# Patient Record
Sex: FEMALE | Race: BLACK OR AFRICAN AMERICAN | NOT HISPANIC OR LATINO
[De-identification: names, ages, dates, MRNs, and addresses within clinical notes are randomized per-mention and may not be internally consistent; named-entity substitution may affect disease eponyms.]

---

## 2015-09-24 RX ORDER — HYDRALAZINE HCL 50 MG
1 TABLET ORAL
Qty: 0 | Refills: 0 | COMMUNITY
Start: 2015-09-24

## 2015-09-24 RX ORDER — ATENOLOL 25 MG/1
1 TABLET ORAL
Qty: 0 | Refills: 0 | COMMUNITY
Start: 2015-09-24

## 2017-06-04 PROBLEM — M25.561 PAIN IN BOTH KNEES, UNSPECIFIED CHRONICITY: Status: ACTIVE | Noted: 2017-06-04

## 2017-06-09 ENCOUNTER — APPOINTMENT (OUTPATIENT)
Dept: ORTHOPEDIC SURGERY | Facility: CLINIC | Age: 82
End: 2017-06-09

## 2017-06-09 VITALS
WEIGHT: 212 LBS | HEART RATE: 41 BPM | HEIGHT: 64 IN | SYSTOLIC BLOOD PRESSURE: 162 MMHG | DIASTOLIC BLOOD PRESSURE: 64 MMHG | BODY MASS INDEX: 36.19 KG/M2

## 2017-06-09 DIAGNOSIS — M25.561 PAIN IN RIGHT KNEE: ICD-10-CM

## 2017-06-09 DIAGNOSIS — M17.12 UNILATERAL PRIMARY OSTEOARTHRITIS, LEFT KNEE: ICD-10-CM

## 2017-06-09 DIAGNOSIS — M25.562 PAIN IN RIGHT KNEE: ICD-10-CM

## 2017-07-19 ENCOUNTER — OUTPATIENT (OUTPATIENT)
Dept: OUTPATIENT SERVICES | Facility: HOSPITAL | Age: 82
LOS: 1 days | End: 2017-07-19
Payer: MEDICARE

## 2017-07-19 VITALS
HEIGHT: 61 IN | WEIGHT: 207.23 LBS | TEMPERATURE: 98 F | DIASTOLIC BLOOD PRESSURE: 59 MMHG | RESPIRATION RATE: 20 BRPM | SYSTOLIC BLOOD PRESSURE: 183 MMHG | HEART RATE: 42 BPM

## 2017-07-19 DIAGNOSIS — M17.12 UNILATERAL PRIMARY OSTEOARTHRITIS, LEFT KNEE: ICD-10-CM

## 2017-07-19 DIAGNOSIS — Z01.818 ENCOUNTER FOR OTHER PREPROCEDURAL EXAMINATION: ICD-10-CM

## 2017-07-19 DIAGNOSIS — I10 ESSENTIAL (PRIMARY) HYPERTENSION: ICD-10-CM

## 2017-07-19 LAB
ANION GAP SERPL CALC-SCNC: 14 MMOL/L — SIGNIFICANT CHANGE UP (ref 5–17)
APTT BLD: 30.2 SEC — SIGNIFICANT CHANGE UP (ref 27.5–37.4)
BASOPHILS # BLD AUTO: 0 K/UL — SIGNIFICANT CHANGE UP (ref 0–0.2)
BASOPHILS NFR BLD AUTO: 0.3 % — SIGNIFICANT CHANGE UP (ref 0–2)
BLD GP AB SCN SERPL QL: SIGNIFICANT CHANGE UP
BUN SERPL-MCNC: 23 MG/DL — HIGH (ref 8–20)
CALCIUM SERPL-MCNC: 9.4 MG/DL — SIGNIFICANT CHANGE UP (ref 8.6–10.2)
CHLORIDE SERPL-SCNC: 104 MMOL/L — SIGNIFICANT CHANGE UP (ref 98–107)
CO2 SERPL-SCNC: 26 MMOL/L — SIGNIFICANT CHANGE UP (ref 22–29)
CREAT SERPL-MCNC: 0.89 MG/DL — SIGNIFICANT CHANGE UP (ref 0.5–1.3)
EOSINOPHIL # BLD AUTO: 0.2 K/UL — SIGNIFICANT CHANGE UP (ref 0–0.5)
EOSINOPHIL NFR BLD AUTO: 2.4 % — SIGNIFICANT CHANGE UP (ref 0–6)
GLUCOSE SERPL-MCNC: 115 MG/DL — SIGNIFICANT CHANGE UP (ref 70–115)
HCT VFR BLD CALC: 38.6 % — SIGNIFICANT CHANGE UP (ref 37–47)
HGB BLD-MCNC: 12.9 G/DL — SIGNIFICANT CHANGE UP (ref 12–16)
INR BLD: 0.99 RATIO — SIGNIFICANT CHANGE UP (ref 0.88–1.16)
LYMPHOCYTES # BLD AUTO: 2 K/UL — SIGNIFICANT CHANGE UP (ref 1–4.8)
LYMPHOCYTES # BLD AUTO: 25.8 % — SIGNIFICANT CHANGE UP (ref 20–55)
MAGNESIUM SERPL-MCNC: 2.1 MG/DL — SIGNIFICANT CHANGE UP (ref 1.6–2.6)
MCHC RBC-ENTMCNC: 30.6 PG — SIGNIFICANT CHANGE UP (ref 27–31)
MCHC RBC-ENTMCNC: 33.4 G/DL — SIGNIFICANT CHANGE UP (ref 32–36)
MCV RBC AUTO: 91.5 FL — SIGNIFICANT CHANGE UP (ref 81–99)
MONOCYTES # BLD AUTO: 0.7 K/UL — SIGNIFICANT CHANGE UP (ref 0–0.8)
MONOCYTES NFR BLD AUTO: 9.4 % — SIGNIFICANT CHANGE UP (ref 3–10)
MRSA PCR RESULT.: SIGNIFICANT CHANGE UP
NEUTROPHILS # BLD AUTO: 4.9 K/UL — SIGNIFICANT CHANGE UP (ref 1.8–8)
NEUTROPHILS NFR BLD AUTO: 62 % — SIGNIFICANT CHANGE UP (ref 37–73)
PLATELET # BLD AUTO: 225 K/UL — SIGNIFICANT CHANGE UP (ref 150–400)
POTASSIUM SERPL-MCNC: 3.4 MMOL/L — LOW (ref 3.5–5.3)
POTASSIUM SERPL-SCNC: 3.4 MMOL/L — LOW (ref 3.5–5.3)
PROTHROM AB SERPL-ACNC: 10.9 SEC — SIGNIFICANT CHANGE UP (ref 9.8–12.7)
RBC # BLD: 4.22 M/UL — LOW (ref 4.4–5.2)
RBC # FLD: 13.9 % — SIGNIFICANT CHANGE UP (ref 11–15.6)
S AUREUS DNA NOSE QL NAA+PROBE: SIGNIFICANT CHANGE UP
SODIUM SERPL-SCNC: 144 MMOL/L — SIGNIFICANT CHANGE UP (ref 135–145)
TYPE + AB SCN PNL BLD: SIGNIFICANT CHANGE UP
WBC # BLD: 7.9 K/UL — SIGNIFICANT CHANGE UP (ref 4.8–10.8)
WBC # FLD AUTO: 7.9 K/UL — SIGNIFICANT CHANGE UP (ref 4.8–10.8)

## 2017-07-19 PROCEDURE — 93005 ELECTROCARDIOGRAM TRACING: CPT

## 2017-07-19 PROCEDURE — 87641 MR-STAPH DNA AMP PROBE: CPT

## 2017-07-19 PROCEDURE — 86901 BLOOD TYPING SEROLOGIC RH(D): CPT

## 2017-07-19 PROCEDURE — 93010 ELECTROCARDIOGRAM REPORT: CPT

## 2017-07-19 PROCEDURE — 36415 COLL VENOUS BLD VENIPUNCTURE: CPT

## 2017-07-19 PROCEDURE — 80048 BASIC METABOLIC PNL TOTAL CA: CPT

## 2017-07-19 PROCEDURE — G0463: CPT

## 2017-07-19 PROCEDURE — 87640 STAPH A DNA AMP PROBE: CPT

## 2017-07-19 PROCEDURE — 83735 ASSAY OF MAGNESIUM: CPT

## 2017-07-19 PROCEDURE — 85027 COMPLETE CBC AUTOMATED: CPT

## 2017-07-19 PROCEDURE — 85730 THROMBOPLASTIN TIME PARTIAL: CPT

## 2017-07-19 PROCEDURE — 86900 BLOOD TYPING SEROLOGIC ABO: CPT

## 2017-07-19 PROCEDURE — 85610 PROTHROMBIN TIME: CPT

## 2017-07-19 PROCEDURE — 86850 RBC ANTIBODY SCREEN: CPT

## 2017-07-19 NOTE — H&P PST ADULT - NSANTHOSAYNRD_GEN_A_CORE
No. RACHELL screening performed.  STOP BANG Legend: 0-2 = LOW Risk; 3-4 = INTERMEDIATE Risk; 5-8 = HIGH Risk

## 2017-07-19 NOTE — H&P PST ADULT - PMH
HTN (hypertension)    Hyperlipidemia    Osteoarthritis Gout    HTN (hypertension)    Hyperlipidemia    Osteoarthritis    Osteoporosis

## 2017-07-19 NOTE — H&P PST ADULT - HISTORY OF PRESENT ILLNESS
82F with osteoarthritis for left knee replacement. 82F with osteoarthritis for left knee replacement. Has had pain "for some years now" and now has gotten so severe, for surgical repair.

## 2017-08-10 ENCOUNTER — TRANSCRIPTION ENCOUNTER (OUTPATIENT)
Age: 82
End: 2017-08-10

## 2017-08-10 ENCOUNTER — APPOINTMENT (OUTPATIENT)
Dept: ORTHOPEDIC SURGERY | Facility: HOSPITAL | Age: 82
End: 2017-08-10

## 2017-08-10 ENCOUNTER — RESULT REVIEW (OUTPATIENT)
Age: 82
End: 2017-08-10

## 2017-08-10 ENCOUNTER — INPATIENT (INPATIENT)
Facility: HOSPITAL | Age: 82
LOS: 1 days | Discharge: NURSING, REHAB OR LONG-TERM CARE | DRG: 470 | End: 2017-08-12
Attending: ORTHOPAEDIC SURGERY | Admitting: ORTHOPAEDIC SURGERY
Payer: MEDICARE

## 2017-08-10 VITALS
HEIGHT: 64 IN | TEMPERATURE: 99 F | SYSTOLIC BLOOD PRESSURE: 170 MMHG | RESPIRATION RATE: 16 BRPM | DIASTOLIC BLOOD PRESSURE: 548 MMHG | WEIGHT: 207.01 LBS | HEART RATE: 72 BPM | OXYGEN SATURATION: 98 %

## 2017-08-10 DIAGNOSIS — Z87.39 PERSONAL HISTORY OF OTHER DISEASES OF THE MUSCULOSKELETAL SYSTEM AND CONNECTIVE TISSUE: ICD-10-CM

## 2017-08-10 DIAGNOSIS — Z78.9 OTHER SPECIFIED HEALTH STATUS: ICD-10-CM

## 2017-08-10 DIAGNOSIS — R32 UNSPECIFIED URINARY INCONTINENCE: ICD-10-CM

## 2017-08-10 DIAGNOSIS — M19.90 UNSPECIFIED OSTEOARTHRITIS, UNSPECIFIED SITE: ICD-10-CM

## 2017-08-10 DIAGNOSIS — I10 ESSENTIAL (PRIMARY) HYPERTENSION: ICD-10-CM

## 2017-08-10 DIAGNOSIS — M17.12 UNILATERAL PRIMARY OSTEOARTHRITIS, LEFT KNEE: ICD-10-CM

## 2017-08-10 DIAGNOSIS — K59.00 CONSTIPATION, UNSPECIFIED: ICD-10-CM

## 2017-08-10 DIAGNOSIS — E78.5 HYPERLIPIDEMIA, UNSPECIFIED: ICD-10-CM

## 2017-08-10 DIAGNOSIS — I49.3 VENTRICULAR PREMATURE DEPOLARIZATION: ICD-10-CM

## 2017-08-10 DIAGNOSIS — Z86.79 PERSONAL HISTORY OF OTHER DISEASES OF THE CIRCULATORY SYSTEM: ICD-10-CM

## 2017-08-10 LAB
BLD GP AB SCN SERPL QL: SIGNIFICANT CHANGE UP
TYPE + AB SCN PNL BLD: SIGNIFICANT CHANGE UP

## 2017-08-10 PROCEDURE — 88311 DECALCIFY TISSUE: CPT | Mod: 26

## 2017-08-10 PROCEDURE — 73560 X-RAY EXAM OF KNEE 1 OR 2: CPT | Mod: 26,LT

## 2017-08-10 PROCEDURE — 27447 TOTAL KNEE ARTHROPLASTY: CPT | Mod: LT

## 2017-08-10 PROCEDURE — 99223 1ST HOSP IP/OBS HIGH 75: CPT

## 2017-08-10 PROCEDURE — 27447 TOTAL KNEE ARTHROPLASTY: CPT | Mod: AS,LT

## 2017-08-10 PROCEDURE — 88305 TISSUE EXAM BY PATHOLOGIST: CPT | Mod: 26

## 2017-08-10 RX ORDER — CEFAZOLIN SODIUM 1 G
2000 VIAL (EA) INJECTION ONCE
Qty: 0 | Refills: 0 | Status: DISCONTINUED | OUTPATIENT
Start: 2017-08-10 | End: 2017-08-10

## 2017-08-10 RX ORDER — ONDANSETRON 8 MG/1
4 TABLET, FILM COATED ORAL ONCE
Qty: 0 | Refills: 0 | Status: DISCONTINUED | OUTPATIENT
Start: 2017-08-10 | End: 2017-08-10

## 2017-08-10 RX ORDER — ALLOPURINOL 300 MG
1 TABLET ORAL
Qty: 0 | Refills: 0 | COMMUNITY

## 2017-08-10 RX ORDER — HYDRALAZINE HCL 50 MG
50 TABLET ORAL
Qty: 0 | Refills: 0 | Status: DISCONTINUED | OUTPATIENT
Start: 2017-08-10 | End: 2017-08-12

## 2017-08-10 RX ORDER — ACETAMINOPHEN 500 MG
650 TABLET ORAL EVERY 6 HOURS
Qty: 0 | Refills: 0 | Status: DISCONTINUED | OUTPATIENT
Start: 2017-08-10 | End: 2017-08-12

## 2017-08-10 RX ORDER — FENTANYL CITRATE 50 UG/ML
50 INJECTION INTRAVENOUS
Qty: 0 | Refills: 0 | Status: DISCONTINUED | OUTPATIENT
Start: 2017-08-10 | End: 2017-08-10

## 2017-08-10 RX ORDER — TRANEXAMIC ACID 100 MG/ML
950 INJECTION, SOLUTION INTRAVENOUS ONCE
Qty: 0 | Refills: 0 | Status: DISCONTINUED | OUTPATIENT
Start: 2017-08-10 | End: 2017-08-10

## 2017-08-10 RX ORDER — CELECOXIB 200 MG/1
200 CAPSULE ORAL ONCE
Qty: 0 | Refills: 0 | Status: COMPLETED | OUTPATIENT
Start: 2017-08-10 | End: 2017-08-10

## 2017-08-10 RX ORDER — HYDROMORPHONE HYDROCHLORIDE 2 MG/ML
0.5 INJECTION INTRAMUSCULAR; INTRAVENOUS; SUBCUTANEOUS
Qty: 0 | Refills: 0 | Status: DISCONTINUED | OUTPATIENT
Start: 2017-08-10 | End: 2017-08-12

## 2017-08-10 RX ORDER — ONDANSETRON 8 MG/1
4 TABLET, FILM COATED ORAL EVERY 6 HOURS
Qty: 0 | Refills: 0 | Status: DISCONTINUED | OUTPATIENT
Start: 2017-08-10 | End: 2017-08-12

## 2017-08-10 RX ORDER — ASPIRIN/CALCIUM CARB/MAGNESIUM 324 MG
162 TABLET ORAL
Qty: 0 | Refills: 0 | Status: DISCONTINUED | OUTPATIENT
Start: 2017-08-11 | End: 2017-08-12

## 2017-08-10 RX ORDER — RALOXIFENE HYDROCHLORIDE 60 MG/1
60 TABLET, COATED ORAL DAILY
Qty: 0 | Refills: 0 | Status: DISCONTINUED | OUTPATIENT
Start: 2017-08-10 | End: 2017-08-12

## 2017-08-10 RX ORDER — VANCOMYCIN HCL 1 G
1500 VIAL (EA) INTRAVENOUS ONCE
Qty: 0 | Refills: 0 | Status: COMPLETED | OUTPATIENT
Start: 2017-08-10 | End: 2017-08-10

## 2017-08-10 RX ORDER — ATENOLOL 25 MG/1
50 TABLET ORAL
Qty: 0 | Refills: 0 | Status: DISCONTINUED | OUTPATIENT
Start: 2017-08-10 | End: 2017-08-12

## 2017-08-10 RX ORDER — SODIUM CHLORIDE 9 MG/ML
3 INJECTION INTRAMUSCULAR; INTRAVENOUS; SUBCUTANEOUS EVERY 8 HOURS
Qty: 0 | Refills: 0 | Status: DISCONTINUED | OUTPATIENT
Start: 2017-08-10 | End: 2017-08-10

## 2017-08-10 RX ORDER — POLYETHYLENE GLYCOL 3350 17 G/17G
17 POWDER, FOR SOLUTION ORAL DAILY
Qty: 0 | Refills: 0 | Status: DISCONTINUED | OUTPATIENT
Start: 2017-08-10 | End: 2017-08-12

## 2017-08-10 RX ORDER — SODIUM CHLORIDE 9 MG/ML
1000 INJECTION, SOLUTION INTRAVENOUS
Qty: 0 | Refills: 0 | Status: DISCONTINUED | OUTPATIENT
Start: 2017-08-10 | End: 2017-08-12

## 2017-08-10 RX ORDER — VANCOMYCIN HCL 1 G
1000 VIAL (EA) INTRAVENOUS
Qty: 0 | Refills: 0 | Status: COMPLETED | OUTPATIENT
Start: 2017-08-10 | End: 2017-08-10

## 2017-08-10 RX ORDER — ALLOPURINOL 300 MG
300 TABLET ORAL DAILY
Qty: 0 | Refills: 0 | Status: DISCONTINUED | OUTPATIENT
Start: 2017-08-10 | End: 2017-08-12

## 2017-08-10 RX ORDER — SODIUM CHLORIDE 9 MG/ML
1000 INJECTION, SOLUTION INTRAVENOUS
Qty: 0 | Refills: 0 | Status: DISCONTINUED | OUTPATIENT
Start: 2017-08-10 | End: 2017-08-10

## 2017-08-10 RX ORDER — HYDROMORPHONE HYDROCHLORIDE 2 MG/ML
2 INJECTION INTRAMUSCULAR; INTRAVENOUS; SUBCUTANEOUS
Qty: 0 | Refills: 0 | Status: DISCONTINUED | OUTPATIENT
Start: 2017-08-10 | End: 2017-08-12

## 2017-08-10 RX ORDER — MAGNESIUM HYDROXIDE 400 MG/1
30 TABLET, CHEWABLE ORAL DAILY
Qty: 0 | Refills: 0 | Status: DISCONTINUED | OUTPATIENT
Start: 2017-08-10 | End: 2017-08-12

## 2017-08-10 RX ORDER — OXYCODONE HYDROCHLORIDE 5 MG/1
5 TABLET ORAL
Qty: 0 | Refills: 0 | Status: DISCONTINUED | OUTPATIENT
Start: 2017-08-10 | End: 2017-08-12

## 2017-08-10 RX ORDER — CEFAZOLIN SODIUM 1 G
2000 VIAL (EA) INJECTION
Qty: 0 | Refills: 0 | Status: COMPLETED | OUTPATIENT
Start: 2017-08-10 | End: 2017-08-10

## 2017-08-10 RX ORDER — DOCUSATE SODIUM 100 MG
100 CAPSULE ORAL THREE TIMES A DAY
Qty: 0 | Refills: 0 | Status: DISCONTINUED | OUTPATIENT
Start: 2017-08-10 | End: 2017-08-12

## 2017-08-10 RX ORDER — SENNA PLUS 8.6 MG/1
2 TABLET ORAL AT BEDTIME
Qty: 0 | Refills: 0 | Status: DISCONTINUED | OUTPATIENT
Start: 2017-08-10 | End: 2017-08-12

## 2017-08-10 RX ORDER — OXYCODONE HYDROCHLORIDE 5 MG/1
10 TABLET ORAL
Qty: 0 | Refills: 0 | Status: DISCONTINUED | OUTPATIENT
Start: 2017-08-10 | End: 2017-08-12

## 2017-08-10 RX ORDER — AMLODIPINE BESYLATE 2.5 MG/1
10 TABLET ORAL DAILY
Qty: 0 | Refills: 0 | Status: DISCONTINUED | OUTPATIENT
Start: 2017-08-11 | End: 2017-08-12

## 2017-08-10 RX ORDER — OXYCODONE HYDROCHLORIDE 5 MG/1
10 TABLET ORAL EVERY 12 HOURS
Qty: 0 | Refills: 0 | Status: DISCONTINUED | OUTPATIENT
Start: 2017-08-10 | End: 2017-08-12

## 2017-08-10 RX ORDER — HYDROCHLOROTHIAZIDE 25 MG
25 TABLET ORAL DAILY
Qty: 0 | Refills: 0 | Status: DISCONTINUED | OUTPATIENT
Start: 2017-08-12 | End: 2017-08-12

## 2017-08-10 RX ORDER — VALSARTAN 80 MG/1
320 TABLET ORAL DAILY
Qty: 0 | Refills: 0 | Status: DISCONTINUED | OUTPATIENT
Start: 2017-08-11 | End: 2017-08-12

## 2017-08-10 RX ADMIN — Medication 250 MILLIGRAM(S): at 18:16

## 2017-08-10 RX ADMIN — Medication 650 MILLIGRAM(S): at 21:48

## 2017-08-10 RX ADMIN — OXYCODONE HYDROCHLORIDE 10 MILLIGRAM(S): 5 TABLET ORAL at 15:36

## 2017-08-10 RX ADMIN — Medication 50 MILLIGRAM(S): at 17:49

## 2017-08-10 RX ADMIN — Medication 100 MILLIGRAM(S): at 21:20

## 2017-08-10 RX ADMIN — Medication 300 MILLIGRAM(S): at 07:12

## 2017-08-10 RX ADMIN — SODIUM CHLORIDE 85 MILLILITER(S): 9 INJECTION, SOLUTION INTRAVENOUS at 17:49

## 2017-08-10 RX ADMIN — Medication 650 MILLIGRAM(S): at 21:21

## 2017-08-10 RX ADMIN — SODIUM CHLORIDE 85 MILLILITER(S): 9 INJECTION, SOLUTION INTRAVENOUS at 18:16

## 2017-08-10 RX ADMIN — CELECOXIB 200 MILLIGRAM(S): 200 CAPSULE ORAL at 07:28

## 2017-08-10 RX ADMIN — FENTANYL CITRATE 50 MICROGRAM(S): 50 INJECTION INTRAVENOUS at 13:10

## 2017-08-10 RX ADMIN — ATENOLOL 50 MILLIGRAM(S): 25 TABLET ORAL at 17:49

## 2017-08-10 RX ADMIN — FENTANYL CITRATE 50 MICROGRAM(S): 50 INJECTION INTRAVENOUS at 13:02

## 2017-08-10 RX ADMIN — OXYCODONE HYDROCHLORIDE 10 MILLIGRAM(S): 5 TABLET ORAL at 16:00

## 2017-08-10 NOTE — CONSULT NOTE ADULT - ATTENDING COMMENTS
pt seen and examined at bedside.  urinary incontinence - most likely 2/2 anesthetics - if persists would check UA. knee pain is tolerable.   she is hypertensive, took her am BP meds.   Ct home meds.  takes albuterol inhalers prn, Singulair for allergic rhinitis/sinusitis  Ct telemetry postop - 2/2 frequent PVCs - currently asymptomatic  Monitor Lytes, CBC

## 2017-08-10 NOTE — PHYSICAL THERAPY INITIAL EVALUATION ADULT - CRITERIA FOR SKILLED THERAPEUTIC INTERVENTIONS
anticipated discharge recommendation/anticipated equipment needs at discharge/Home with home PT, RW vs GABINO/rehab potential/impairments found

## 2017-08-10 NOTE — PHYSICAL THERAPY INITIAL EVALUATION ADULT - RANGE OF MOTION EXAMINATION, REHAB EVAL
Right LE ROM was WFL (within functional limits)/left knee lacking approx 15-20 degrees extension (in sitting) to approx 30-35 degrees flexion (in supine)/bilateral upper extremity ROM was WFL (within functional limits)

## 2017-08-10 NOTE — CONSULT NOTE ADULT - PROBLEM SELECTOR RECOMMENDATION 9
S/P Left total knee replacement  08/10/2017   Post op abx as per SCIP  DVT ppx as per Ortho  pain control  PT  IS

## 2017-08-10 NOTE — PHYSICAL THERAPY INITIAL EVALUATION ADULT - GAIT DEVIATIONS NOTED, PT EVAL
decreased weight-shifting ability/decreased stance time 2*2 pain with weightbearing on LLE/decreased tristan

## 2017-08-10 NOTE — PHYSICAL THERAPY INITIAL EVALUATION ADULT - ADDITIONAL COMMENTS
Pt lives in a private home with her daughter. 4 steps to enter with handrails, 12 steps inside with 1 handrail. Pt was independent PTA without assist device. Pt does not owns DME.

## 2017-08-10 NOTE — PROGRESS NOTE ADULT - SUBJECTIVE AND OBJECTIVE BOX
SARAH WOLFE  [id number]    History: 82y Female is status post left total knee arthroplasty on 8/10/17, POD # 0. Patient is doing well and is comfortable. The patient's pain is controlled using the prescribed pain medications. The patient is participating in physical therapy. Denies nausea, vomiting, chest pain, shortness of breath, abdominal pain or fever. No new complaints.    MEDICATIONS  (STANDING):  allopurinol 300 milliGRAM(s) Oral daily  raloxifene 60 milliGRAM(s) Oral daily  hydrALAZINE 50 milliGRAM(s) Oral two times a day  lactated ringers. 1000 milliLiter(s) (85 mL/Hr) IV Continuous <Continuous>  acetaminophen   Tablet. 650 milliGRAM(s) Oral every 6 hours  ceFAZolin   IVPB 2000 milliGRAM(s) IV Intermittent <User Schedule>  polyethylene glycol 3350 17 Gram(s) Oral daily  docusate sodium 100 milliGRAM(s) Oral three times a day  oxyCODONE  ER Tablet 10 milliGRAM(s) Oral every 12 hours  ATENolol  Tablet 50 milliGRAM(s) Oral two times a day    MEDICATIONS  (PRN):  acetaminophen   Tablet 650 milliGRAM(s) Oral every 6 hours PRN For Temp over 38.3 C (100.94 F)  oxyCODONE    IR 5 milliGRAM(s) Oral every 3 hours PRN Mild Pain  oxyCODONE    IR 10 milliGRAM(s) Oral every 3 hours PRN Moderate Pain  HYDROmorphone  Injectable 0.5 milliGRAM(s) IV Push every 3 hours PRN breakthrough pain  aluminum hydroxide/magnesium hydroxide/simethicone Suspension 30 milliLiter(s) Oral four times a day PRN Indigestion  ondansetron Injectable 4 milliGRAM(s) IV Push every 6 hours PRN Nausea and/or Vomiting  magnesium hydroxide Suspension 30 milliLiter(s) Oral daily PRN Constipation  senna 2 Tablet(s) Oral at bedtime PRN Constipation  HYDROmorphone   Tablet 2 milliGRAM(s) Oral every 3 hours PRN Severe Pain (7 - 10)    Physical exam: The left knee dressing is clean, dry and intact. Hemovac intact and functioning. The calf is supple nontender. Passive range of motion is acceptable to due postoperative pain. Sensation to light touch is grossly intact distally. Motor function distally is 5/5. Extensor hallucis longus and flexor hallucis longus are intact. No foot drop. 2+ dorsalis pedis pulse. Capillary refill is less than 2 seconds. No cyanosis.    Primary Orthopedic Assessment:  •	s/p LEFT total knee replacement	    Plan:   •	DVT prophylaxis as prescribed, including use of compression devices and ankle pumps  •	Continue physical therapy  •	Weightbearing as tolerated of the left lower extremity with assistance of a walker  •	Incentive spirometry encouraged  •	Pain control as clinically indicated  •	Discharge planning – anticipated discharge is Home

## 2017-08-10 NOTE — PHYSICAL THERAPY INITIAL EVALUATION ADULT - GENERAL OBSERVATIONS, REHAB EVAL
Pt received supine on stretcher in PACU, + telemetry, + Venous Compression Boots + IV, no c/o pain at rest, agreeable to PT

## 2017-08-10 NOTE — CONSULT NOTE ADULT - SUBJECTIVE AND OBJECTIVE BOX
CC: Left Knee Pain    History of Present Illness	  82F with H/O HTN, HLD,  osteoarthritis of the left knee .  Has had pain "for some years now" and now has gotten so severe, for surgical repair. Now S/P left knee replacement, POD#0, patient seen and examined in PACU.         PAST MEDICAL & SURGICAL HISTORY:  Osteoporosis  Gout  Osteoarthritis  Hyperlipidemia  HTN (hypertension)  No significant past surgical history    Home Medications:     · 	hydrALAZINE 50 mg oral tablet , 1 tab(s) orally 2 times a day  · 	atenolol 50 mg oral tablet 1 tab(s) orally 2 times a day  · 	Exforge HCT 10 mg-320 mg-25 mg oral tablet 1 tab(s) orally once a day  · 	raloxifene 60 mg oral tablet 1 tab(s) orally once a day  · 	allopurinol 300 mg oral tablet 1 tab(s) orally once a day  · 	aspirin 81 mg oral tablet  1 tab(s) orally once a day  · 	Citracal: , 2 tab(s) orally once a day  · 	naproxen:     MEDICATIONS  (STANDING):  lactated ringers. 1000 milliLiter(s) (100 mL/Hr) IV Continuous <Continuous>  oxyCODONE  ER Tablet 10 milliGRAM(s) Oral every 12 hours  vancomycin  IVPB 1000 milliGRAM(s) IV Intermittent <User Schedule>    MEDICATIONS  (PRN):  fentaNYL    Injectable 50 MICROGram(s) IV Push every 5 minutes PRN Severe Pain  ondansetron Injectable 4 milliGRAM(s) IV Push once PRN Nausea and/or Vomiting  HYDROmorphone   Tablet 2 milliGRAM(s) Oral every 3 hours PRN Severe Pain (7 - 10)      Allergies    lisinopril (Angioedema)  streptomycin (Angioedema)    Intolerances        SOCIAL HISTORY:  Never a smoker  Denies ETOH/IVDA    FAMILY HISTORY:  Family history of breast cancer (Mother)      Vital Signs Last 24 Hrs  T(C): 37 (10 Aug 2017 12:00), Max: 37.2 (10 Aug 2017 06:55)  T(F): 98.6 (10 Aug 2017 12:00), Max: 99 (10 Aug 2017 06:55)  HR: 79 (10 Aug 2017 12:00) (71 - 79)  BP: 160/66 (10 Aug 2017 12:00) (106/94 - 170/548)  BP(mean): 86 (10 Aug 2017 12:00) (54 - 107)  RR: 22 (10 Aug 2017 12:00) (16 - 24)  SpO2: 96% (10 Aug 2017 12:00) (96% - 100%)    LABS:                  RADIOLOGY & ADDITIONAL STUDIES: CC: Left Knee Pain    History of Present Illness	  82F with H/O HTN, HLD,  osteoarthritis of the left knee .  Has had pain "for some years now" and now has gotten so severe, for surgical repair. Now S/P left knee replacement, POD#0, patient seen and examined in PACU.         PAST MEDICAL & SURGICAL HISTORY:  Osteoporosis  Gout  Osteoarthritis  Hyperlipidemia  HTN (hypertension)  No significant past surgical history    Home Medications:     · 	hydrALAZINE 50 mg oral tablet , 1 tab(s) orally 2 times a day  · 	atenolol 50 mg oral tablet 1 tab(s) orally 2 times a day  · 	Exforge HCT 10 mg-320 mg-25 mg oral tablet 1 tab(s) orally once a day  · 	raloxifene 60 mg oral tablet 1 tab(s) orally once a day  · 	allopurinol 300 mg oral tablet 1 tab(s) orally once a day  · 	aspirin 81 mg oral tablet  1 tab(s) orally once a day  · 	Citracal: , 2 tab(s) orally once a day  · 	naproxen:     MEDICATIONS  (STANDING):  lactated ringers. 1000 milliLiter(s) (100 mL/Hr) IV Continuous <Continuous>  oxyCODONE  ER Tablet 10 milliGRAM(s) Oral every 12 hours  vancomycin  IVPB 1000 milliGRAM(s) IV Intermittent <User Schedule>    MEDICATIONS  (PRN):  fentaNYL    Injectable 50 MICROGram(s) IV Push every 5 minutes PRN Severe Pain  ondansetron Injectable 4 milliGRAM(s) IV Push once PRN Nausea and/or Vomiting  HYDROmorphone   Tablet 2 milliGRAM(s) Oral every 3 hours PRN Severe Pain (7 - 10)      Allergies    lisinopril (Angioedema)  streptomycin (Angioedema)    Intolerances        SOCIAL HISTORY:  Never a smoker  Denies ETOH/IVDA    FAMILY HISTORY:  Family history of breast cancer (Mother)      Vital Signs Last 24 Hrs  T(C): 37 (10 Aug 2017 12:00), Max: 37.2 (10 Aug 2017 06:55)  T(F): 98.6 (10 Aug 2017 12:00), Max: 99 (10 Aug 2017 06:55)  HR: 79 (10 Aug 2017 12:00) (71 - 79)  BP: 160/66 (10 Aug 2017 12:00) (106/94 - 170/548)  BP(mean): 86 (10 Aug 2017 12:00) (54 - 107)  RR: 22 (10 Aug 2017 12:00) (16 - 24)  SpO2: 96% (10 Aug 2017 12:00) (96% - 100%)    LABS:                  RADIOLOGY & ADDITIONAL STUDIES:    ROS:  +left knee pain, +constipation, +urinary incontinence  Constitutional, Eyes, ENT, Cardiovascular, Respiratory,  Neurological, Integumentary, Psychiatric, Endocrine, Heme/Lymph are otherwise negative.

## 2017-08-10 NOTE — CONSULT NOTE ADULT - ASSESSMENT
82F with H/O HTN, HLD,  osteoarthritis of the left knee .  Has had pain "for some years now" and now has gotten so severe, for surgical repair. Now S/P left knee replacement, POD#0, patient seen and examined in PACU.

## 2017-08-11 LAB
ANION GAP SERPL CALC-SCNC: 13 MMOL/L — SIGNIFICANT CHANGE UP (ref 5–17)
BUN SERPL-MCNC: 10 MG/DL — SIGNIFICANT CHANGE UP (ref 8–20)
CALCIUM SERPL-MCNC: 8.7 MG/DL — SIGNIFICANT CHANGE UP (ref 8.6–10.2)
CHLORIDE SERPL-SCNC: 103 MMOL/L — SIGNIFICANT CHANGE UP (ref 98–107)
CO2 SERPL-SCNC: 28 MMOL/L — SIGNIFICANT CHANGE UP (ref 22–29)
CREAT SERPL-MCNC: 0.7 MG/DL — SIGNIFICANT CHANGE UP (ref 0.5–1.3)
GLUCOSE SERPL-MCNC: 131 MG/DL — HIGH (ref 70–115)
HCT VFR BLD CALC: 34.4 % — LOW (ref 37–47)
HGB BLD-MCNC: 11.2 G/DL — LOW (ref 12–16)
MCHC RBC-ENTMCNC: 30.2 PG — SIGNIFICANT CHANGE UP (ref 27–31)
MCHC RBC-ENTMCNC: 32.6 G/DL — SIGNIFICANT CHANGE UP (ref 32–36)
MCV RBC AUTO: 92.7 FL — SIGNIFICANT CHANGE UP (ref 81–99)
PLATELET # BLD AUTO: 194 K/UL — SIGNIFICANT CHANGE UP (ref 150–400)
POTASSIUM SERPL-MCNC: 3.9 MMOL/L — SIGNIFICANT CHANGE UP (ref 3.5–5.3)
POTASSIUM SERPL-SCNC: 3.9 MMOL/L — SIGNIFICANT CHANGE UP (ref 3.5–5.3)
RBC # BLD: 3.71 M/UL — LOW (ref 4.4–5.2)
RBC # FLD: 13.6 % — SIGNIFICANT CHANGE UP (ref 11–15.6)
SODIUM SERPL-SCNC: 144 MMOL/L — SIGNIFICANT CHANGE UP (ref 135–145)
WBC # BLD: 14.1 K/UL — HIGH (ref 4.8–10.8)
WBC # FLD AUTO: 14.1 K/UL — HIGH (ref 4.8–10.8)

## 2017-08-11 PROCEDURE — 99233 SBSQ HOSP IP/OBS HIGH 50: CPT

## 2017-08-11 RX ADMIN — VALSARTAN 320 MILLIGRAM(S): 80 TABLET ORAL at 06:00

## 2017-08-11 RX ADMIN — SODIUM CHLORIDE 85 MILLILITER(S): 9 INJECTION, SOLUTION INTRAVENOUS at 05:59

## 2017-08-11 RX ADMIN — Medication 650 MILLIGRAM(S): at 17:36

## 2017-08-11 RX ADMIN — Medication 50 MILLIGRAM(S): at 06:00

## 2017-08-11 RX ADMIN — OXYCODONE HYDROCHLORIDE 10 MILLIGRAM(S): 5 TABLET ORAL at 06:01

## 2017-08-11 RX ADMIN — Medication 162 MILLIGRAM(S): at 06:00

## 2017-08-11 RX ADMIN — POLYETHYLENE GLYCOL 3350 17 GRAM(S): 17 POWDER, FOR SOLUTION ORAL at 11:05

## 2017-08-11 RX ADMIN — Medication 650 MILLIGRAM(S): at 18:10

## 2017-08-11 RX ADMIN — RALOXIFENE HYDROCHLORIDE 60 MILLIGRAM(S): 60 TABLET, COATED ORAL at 11:05

## 2017-08-11 RX ADMIN — OXYCODONE HYDROCHLORIDE 10 MILLIGRAM(S): 5 TABLET ORAL at 11:49

## 2017-08-11 RX ADMIN — Medication 162 MILLIGRAM(S): at 17:37

## 2017-08-11 RX ADMIN — Medication 300 MILLIGRAM(S): at 11:05

## 2017-08-11 RX ADMIN — Medication 650 MILLIGRAM(S): at 11:49

## 2017-08-11 RX ADMIN — AMLODIPINE BESYLATE 10 MILLIGRAM(S): 2.5 TABLET ORAL at 06:00

## 2017-08-11 RX ADMIN — ATENOLOL 50 MILLIGRAM(S): 25 TABLET ORAL at 17:37

## 2017-08-11 RX ADMIN — OXYCODONE HYDROCHLORIDE 10 MILLIGRAM(S): 5 TABLET ORAL at 18:10

## 2017-08-11 RX ADMIN — OXYCODONE HYDROCHLORIDE 10 MILLIGRAM(S): 5 TABLET ORAL at 06:00

## 2017-08-11 RX ADMIN — OXYCODONE HYDROCHLORIDE 10 MILLIGRAM(S): 5 TABLET ORAL at 17:36

## 2017-08-11 RX ADMIN — Medication 100 MILLIGRAM(S): at 11:08

## 2017-08-11 RX ADMIN — OXYCODONE HYDROCHLORIDE 10 MILLIGRAM(S): 5 TABLET ORAL at 11:04

## 2017-08-11 RX ADMIN — Medication 650 MILLIGRAM(S): at 06:00

## 2017-08-11 RX ADMIN — Medication 650 MILLIGRAM(S): at 11:06

## 2017-08-11 RX ADMIN — Medication 650 MILLIGRAM(S): at 00:52

## 2017-08-11 RX ADMIN — Medication 50 MILLIGRAM(S): at 17:37

## 2017-08-11 RX ADMIN — ATENOLOL 50 MILLIGRAM(S): 25 TABLET ORAL at 06:00

## 2017-08-11 RX ADMIN — Medication 100 MILLIGRAM(S): at 20:52

## 2017-08-11 RX ADMIN — Medication 100 MILLIGRAM(S): at 06:00

## 2017-08-11 RX ADMIN — Medication 650 MILLIGRAM(S): at 06:01

## 2017-08-11 NOTE — PROGRESS NOTE ADULT - SUBJECTIVE AND OBJECTIVE BOX
Pt seen, chart reviewed.  S/p Left TKA, POD#1.  VSS.  Adequate pain control.  Resting comfortably.   Tolerating PO intake.  No N/V.    No anesthesia problems noted.

## 2017-08-11 NOTE — PROGRESS NOTE ADULT - SUBJECTIVE AND OBJECTIVE BOX
SARAH WOLFE  [id number]    History: 82y Female is status post left total knee arthroplasty on 8/11/2017, POD # 1. Patient is doing well and is comfortable. The patient's pain is controlled using the prescribed pain medications. The patient is participating in physical therapy. Denies nausea, vomiting, chest pain, shortness of breath, abdominal pain or fever. No new complaints. + out of bed yesterday.                          11.2   14.1  )-----------( 194      ( 11 Aug 2017 05:42 )             34.4     08-11    144  |  103  |  10.0  ----------------------------<  131<H>  3.9   |  28.0  |  0.70    Ca    8.7      11 Aug 2017 05:42        I&O's Detail    10 Aug 2017 07:01  -  11 Aug 2017 07:00  --------------------------------------------------------  IN:    lactated ringers.: 1190 mL    lactated ringers.: 700 mL    Oral Fluid: 480 mL    Solution: 250 mL    Solution: 50 mL  Total IN: 2670 mL    OUT:    Accordian: 375 mL    Voided: 1000 mL  Total OUT: 1375 mL    Total NET: 1295 mL        MEDICATIONS  (STANDING):  allopurinol 300 milliGRAM(s) Oral daily  amLODIPine   Tablet 10 milliGRAM(s) Oral daily  valsartan 320 milliGRAM(s) Oral daily  raloxifene 60 milliGRAM(s) Oral daily  hydrALAZINE 50 milliGRAM(s) Oral two times a day  lactated ringers. 1000 milliLiter(s) (85 mL/Hr) IV Continuous <Continuous>  aspirin enteric coated 162 milliGRAM(s) Oral two times a day  acetaminophen   Tablet. 650 milliGRAM(s) Oral every 6 hours  polyethylene glycol 3350 17 Gram(s) Oral daily  docusate sodium 100 milliGRAM(s) Oral three times a day  oxyCODONE  ER Tablet 10 milliGRAM(s) Oral every 12 hours  ATENolol  Tablet 50 milliGRAM(s) Oral two times a day    MEDICATIONS  (PRN):  acetaminophen   Tablet 650 milliGRAM(s) Oral every 6 hours PRN For Temp over 38.3 C (100.94 F)  oxyCODONE    IR 5 milliGRAM(s) Oral every 3 hours PRN Mild Pain  oxyCODONE    IR 10 milliGRAM(s) Oral every 3 hours PRN Moderate Pain  HYDROmorphone  Injectable 0.5 milliGRAM(s) IV Push every 3 hours PRN breakthrough pain  aluminum hydroxide/magnesium hydroxide/simethicone Suspension 30 milliLiter(s) Oral four times a day PRN Indigestion  ondansetron Injectable 4 milliGRAM(s) IV Push every 6 hours PRN Nausea and/or Vomiting  magnesium hydroxide Suspension 30 milliLiter(s) Oral daily PRN Constipation  senna 2 Tablet(s) Oral at bedtime PRN Constipation  HYDROmorphone   Tablet 2 milliGRAM(s) Oral every 3 hours PRN Severe Pain (7 - 10)      Physical exam: The left knee dressing is clean, dry and intact. + drain noted. No drainage or discharge. No erythema is noted. No blistering. No ecchymosis. The calf is supple nontender. Passive range of motion is acceptable to due postoperative pain. No calf tenderness. Sensation to light touch is grossly intact distally. Motor function distally is 5/5. Extensor hallucis longus and flexor hallucis longus are intact. No foot drop. 2+ dorsalis pedis pulse. Capillary refill is less than 2 seconds. No cyanosis.    Primary Orthopedic Assessment:  • s/p LEFT total knee replacement      Secondary  Medical Assessment(s):   Constipation: Constipation  Urinary incontinence: Urinary incontinence  Hyperlipidemia: Hyperlipidemia  HTN (hypertension): HTN (hypertension)  PVC's (premature ventricular contractions): PVC&#x27;s (premature ventricular contractions)  Osteoarthritis: Osteoarthritis       Plan:   • DVT prophylaxis as prescribed, including use of compression devices and ankle pumps  • Continue physical therapy  • Weightbearing as tolerated of the left lower extremity with assistance of a walker  • Incentive spirometry encouraged  • Pain control as clinically indicated  • Discharge planning – anticipated discharge is subacute rehabilitation

## 2017-08-11 NOTE — DISCHARGE NOTE ADULT - MEDICATION SUMMARY - MEDICATIONS TO TAKE
I will START or STAY ON the medications listed below when I get home from the hospital:    oxyCODONE 5 mg oral tablet  -- 1 tab(s) by mouth every 4 to 6 hours, As Needed -Mild Pain  -- Indication: For Pain control    oxyCODONE 10 mg oral tablet  -- 1 tab(s) by mouth every 3 hours, As needed, Moderate Pain  -- Indication: For Pain control    aspirin 81 mg oral tablet  -- 1 tab(s) by mouth once a day. begin after post op aspirin regimen completed  -- Indication: For Home med. restart after 6 weeks aspirin regimen completed    aspirin 162 mg oral delayed release tablet  -- 1 tab(s) by mouth 2 times a day. continue for 6 weeks post op  -- Indication: For DVT PPX> continue for 6 weeks    allopurinol 300 mg oral tablet  -- 1 tab(s) by mouth once a day  -- Indication: For Home med    Exforge HCT 10 mg-320 mg-25 mg oral tablet  -- 1 tab(s) by mouth once a day  -- Indication: For Home med    raloxifene 60 mg oral tablet  -- 1 tab(s) by mouth once a day  -- Indication: For Home med    atenolol 50 mg oral tablet  -- 1 tab(s) by mouth 2 times a day  -- Indication: For Home med    docusate sodium 100 mg oral capsule  -- 1 cap(s) by mouth 3 times a day  -- Indication: For Constipation    senna oral tablet  -- 2 tab(s) by mouth once a day (at bedtime), As needed, Constipation  -- Indication: For Constipation    Citracal  -- 2 tab(s) by mouth once a day  -- Indication: For Home med    hydrALAZINE 50 mg oral tablet  -- 1 tab(s) by mouth 2 times a day  -- Indication: For Home med

## 2017-08-11 NOTE — DISCHARGE NOTE ADULT - HOSPITAL COURSE
The patient underwent a LEFT TOTAL KNEE REPLACEMENT on 8/10/17. The patient received antibiotics consistent with SCIP guidelines. The patient underwent the procedure and had no intra-operative complications. Post-operatively, the patient was seen by medicine and PT. The patient received ASPIRIN for DVTP. The patient received pain medications per orthopedic pain managment protocol and the pain was appropriately controlled. The patient did not have any post-operative medical complications. The patient was discharged in stable condition.

## 2017-08-11 NOTE — PROGRESS NOTE ADULT - SUBJECTIVE AND OBJECTIVE BOX
SARAH WOLFE    [id number]    82y      Female    HPI in brief: 82F with H/O HTN, HLD, h/o PVCs, osteoarthritis of the left knee .  Has had pain "for some years now" and now has gotten so severe, for surgical repair. Now S/P left knee replacement, POD#1. Post operative had urinary incontinence.       CC: Feels urinary frequency is better but still has some episodes of incontinence  denies dysuria, burning  pain is tolerable  constipation     INTERVAL HPI/OVERNIGHT EVENTS: no acute events      REVIEW OF SYSTEMS:    CONSTITUTIONAL: No fever  RESPIRATORY: dry cough No shortness of breath  CARDIOVASCULAR: No chest pain, palpitations    TELEMETRY : reviewed - frequent multifocal PVCs    Vital Signs Last 24 Hrs  T(C): 36.8 (11 Aug 2017 08:35), Max: 37 (10 Aug 2017 12:00)  T(F): 98.2 (11 Aug 2017 08:35), Max: 98.6 (10 Aug 2017 12:00)  HR: 73 (11 Aug 2017 08:35) (62 - 87)  BP: 153/61 (11 Aug 2017 08:35) (106/94 - 177/80)  BP(mean): 73 (10 Aug 2017 16:00) (54 - 107)  RR: 18 (11 Aug 2017 08:35) (16 - 25)  SpO2: 96% (11 Aug 2017 08:35) (96% - 100%)    PHYSICAL EXAM:    GENERAL: NAD, well-groomed  HEENT: PERRL, +EOMI  NECK: soft, Supple, No JVD,   CHEST/LUNG: Clear to percussion bilaterally; No wheezing  HEART: S1S2+, Regular rate and rhythm; No murmurs, rubs, or gallops  EXTREMITIES:  No clubbing, cyanosis, or edema  NEURO: AAOX3    08-10 @ 07:01  -  08-11 @ 07:00  --------------------------------------------------------  IN: 2670 mL / OUT: 1375 mL / NET: 1295 mL        LABS:                        11.2   14.1  )-----------( 194      ( 11 Aug 2017 05:42 )             34.4     08-11    144  |  103  |  10.0  ----------------------------<  131<H>  3.9   |  28.0  |  0.70    Ca    8.7      11 Aug 2017 05:42              MEDICATIONS  : reviewed

## 2017-08-11 NOTE — PROGRESS NOTE ADULT - PROBLEM SELECTOR PLAN 3
most likely 2/2 spinal drugs  Improving - observe, check UA   r/o UTI if continues  treat constipation

## 2017-08-11 NOTE — PROGRESS NOTE ADULT - ASSESSMENT
82F with H/O HTN, HLD,  osteoarthritis of the left knee .  Has had pain "for some years now" and now has gotten so severe, for surgical repair. Now S/P left knee replacement, POD#1. Post operative had urinary incontinence.

## 2017-08-11 NOTE — PROGRESS NOTE ADULT - PROBLEM SELECTOR PLAN 1
s/p Lt knee replacement   ct pain control  Bowel regimen  DVT px - high risk  incentive spirometry  PT

## 2017-08-11 NOTE — DISCHARGE NOTE ADULT - PATIENT PORTAL LINK FT
“You can access the [facility] Patient Portal, offered by [facility], by registering with the following website: [email or web address]”

## 2017-08-11 NOTE — DISCHARGE NOTE ADULT - PLAN OF CARE
improve pain and function of left knee The patient will be seen in the office in 2 weeks for wound check. Tape will be removed at that time. Patient may shower after post-op day #3. The dressing is to be removed on 8/20/17. IF THE DRESSING BECOMES SOILED BEFORE THE REMOVAL DATE, CHANGE WITH A SIMILAR DRESSING. IF THE DRESSING BECOMES STAINED WITH DISCHARGE, CONTACT THE OFFICE FOR FURTHER DIRECTIONS. The patient will contact the office if the wound becomes red, has increasing pain, develops bleeding or discharge, an injury occurs, or has other concerns. The patient will continue PT consistent with total knee replacement. The patient will continue aspirin 162mg twice daily for 6 weeks for blood clot prevention. The patient will take OXYCODONE AND TYLENOL for pain control and titrate according to prescription and patient needs. The patient will take Colace while taking oxycodone to prevent narcotic associated constipation.  Additionally, increase water intake (drink at least 8 glasses of water daily) and try adding fiber to the diet by eating fruits, vegetables and foods that are rich in grains. If constipation is experienced, contact the medical/primary care provider to discuss further treatment options. The patient is FULL weight bearing. Elevation of the lower leg is recommended to reduce swelling.

## 2017-08-11 NOTE — DISCHARGE NOTE ADULT - CARE PLAN
Principal Discharge DX:	Primary osteoarthritis of left knee  Goal:	improve pain and function of left knee  Instructions for follow-up, activity and diet:	The patient will be seen in the office in 2 weeks for wound check. Tape will be removed at that time. Patient may shower after post-op day #3. The dressing is to be removed on 8/20/17. IF THE DRESSING BECOMES SOILED BEFORE THE REMOVAL DATE, CHANGE WITH A SIMILAR DRESSING. IF THE DRESSING BECOMES STAINED WITH DISCHARGE, CONTACT THE OFFICE FOR FURTHER DIRECTIONS. The patient will contact the office if the wound becomes red, has increasing pain, develops bleeding or discharge, an injury occurs, or has other concerns. The patient will continue PT consistent with total knee replacement. The patient will continue aspirin 162mg twice daily for 6 weeks for blood clot prevention. The patient will take OXYCODONE AND TYLENOL for pain control and titrate according to prescription and patient needs. The patient will take Colace while taking oxycodone to prevent narcotic associated constipation.  Additionally, increase water intake (drink at least 8 glasses of water daily) and try adding fiber to the diet by eating fruits, vegetables and foods that are rich in grains. If constipation is experienced, contact the medical/primary care provider to discuss further treatment options. The patient is FULL weight bearing. Elevation of the lower leg is recommended to reduce swelling.

## 2017-08-12 VITALS
SYSTOLIC BLOOD PRESSURE: 136 MMHG | OXYGEN SATURATION: 90 % | DIASTOLIC BLOOD PRESSURE: 73 MMHG | RESPIRATION RATE: 18 BRPM | TEMPERATURE: 98 F | HEART RATE: 76 BPM

## 2017-08-12 LAB
ANION GAP SERPL CALC-SCNC: 10 MMOL/L — SIGNIFICANT CHANGE UP (ref 5–17)
BUN SERPL-MCNC: 21 MG/DL — HIGH (ref 8–20)
CALCIUM SERPL-MCNC: 8.2 MG/DL — LOW (ref 8.6–10.2)
CHLORIDE SERPL-SCNC: 102 MMOL/L — SIGNIFICANT CHANGE UP (ref 98–107)
CO2 SERPL-SCNC: 29 MMOL/L — SIGNIFICANT CHANGE UP (ref 22–29)
CREAT SERPL-MCNC: 0.96 MG/DL — SIGNIFICANT CHANGE UP (ref 0.5–1.3)
GLUCOSE SERPL-MCNC: 102 MG/DL — SIGNIFICANT CHANGE UP (ref 70–115)
HCT VFR BLD CALC: 31.1 % — LOW (ref 37–47)
HGB BLD-MCNC: 10.4 G/DL — LOW (ref 12–16)
MAGNESIUM SERPL-MCNC: 1.9 MG/DL — SIGNIFICANT CHANGE UP (ref 1.6–2.6)
MCHC RBC-ENTMCNC: 31 PG — SIGNIFICANT CHANGE UP (ref 27–31)
MCHC RBC-ENTMCNC: 33.4 G/DL — SIGNIFICANT CHANGE UP (ref 32–36)
MCV RBC AUTO: 92.6 FL — SIGNIFICANT CHANGE UP (ref 81–99)
PLATELET # BLD AUTO: 189 K/UL — SIGNIFICANT CHANGE UP (ref 150–400)
POTASSIUM SERPL-MCNC: 4 MMOL/L — SIGNIFICANT CHANGE UP (ref 3.5–5.3)
POTASSIUM SERPL-SCNC: 4 MMOL/L — SIGNIFICANT CHANGE UP (ref 3.5–5.3)
RBC # BLD: 3.36 M/UL — LOW (ref 4.4–5.2)
RBC # FLD: 14.2 % — SIGNIFICANT CHANGE UP (ref 11–15.6)
SODIUM SERPL-SCNC: 141 MMOL/L — SIGNIFICANT CHANGE UP (ref 135–145)
WBC # BLD: 10 K/UL — SIGNIFICANT CHANGE UP (ref 4.8–10.8)
WBC # FLD AUTO: 10 K/UL — SIGNIFICANT CHANGE UP (ref 4.8–10.8)

## 2017-08-12 PROCEDURE — 88305 TISSUE EXAM BY PATHOLOGIST: CPT

## 2017-08-12 PROCEDURE — 36415 COLL VENOUS BLD VENIPUNCTURE: CPT

## 2017-08-12 PROCEDURE — C1776: CPT

## 2017-08-12 PROCEDURE — 86901 BLOOD TYPING SEROLOGIC RH(D): CPT

## 2017-08-12 PROCEDURE — 86900 BLOOD TYPING SEROLOGIC ABO: CPT

## 2017-08-12 PROCEDURE — 97163 PT EVAL HIGH COMPLEX 45 MIN: CPT

## 2017-08-12 PROCEDURE — 97116 GAIT TRAINING THERAPY: CPT

## 2017-08-12 PROCEDURE — 88311 DECALCIFY TISSUE: CPT

## 2017-08-12 PROCEDURE — 80048 BASIC METABOLIC PNL TOTAL CA: CPT

## 2017-08-12 PROCEDURE — 83735 ASSAY OF MAGNESIUM: CPT

## 2017-08-12 PROCEDURE — 86850 RBC ANTIBODY SCREEN: CPT

## 2017-08-12 PROCEDURE — 85027 COMPLETE CBC AUTOMATED: CPT

## 2017-08-12 PROCEDURE — 97110 THERAPEUTIC EXERCISES: CPT

## 2017-08-12 PROCEDURE — 73560 X-RAY EXAM OF KNEE 1 OR 2: CPT

## 2017-08-12 PROCEDURE — C1713: CPT

## 2017-08-12 RX ORDER — OXYCODONE HYDROCHLORIDE 5 MG/1
1 TABLET ORAL
Qty: 0 | Refills: 0 | COMMUNITY
Start: 2017-08-12

## 2017-08-12 RX ORDER — ASPIRIN/CALCIUM CARB/MAGNESIUM 324 MG
1 TABLET ORAL
Qty: 0 | Refills: 0 | COMMUNITY

## 2017-08-12 RX ORDER — DOCUSATE SODIUM 100 MG
1 CAPSULE ORAL
Qty: 0 | Refills: 0 | COMMUNITY
Start: 2017-08-12

## 2017-08-12 RX ORDER — MULTIVIT WITH MIN/MFOLATE/K2 340-15/3 G
150 POWDER (GRAM) ORAL ONCE
Qty: 0 | Refills: 0 | Status: COMPLETED | OUTPATIENT
Start: 2017-08-12 | End: 2017-08-12

## 2017-08-12 RX ORDER — SENNA PLUS 8.6 MG/1
2 TABLET ORAL
Qty: 0 | Refills: 0 | COMMUNITY
Start: 2017-08-12

## 2017-08-12 RX ADMIN — Medication 650 MILLIGRAM(S): at 11:50

## 2017-08-12 RX ADMIN — Medication 50 MILLIGRAM(S): at 17:11

## 2017-08-12 RX ADMIN — Medication 25 MILLIGRAM(S): at 05:37

## 2017-08-12 RX ADMIN — Medication 650 MILLIGRAM(S): at 11:19

## 2017-08-12 RX ADMIN — OXYCODONE HYDROCHLORIDE 10 MILLIGRAM(S): 5 TABLET ORAL at 05:45

## 2017-08-12 RX ADMIN — Medication 100 MILLIGRAM(S): at 11:19

## 2017-08-12 RX ADMIN — Medication 162 MILLIGRAM(S): at 05:40

## 2017-08-12 RX ADMIN — Medication 100 MILLIGRAM(S): at 05:37

## 2017-08-12 RX ADMIN — Medication 650 MILLIGRAM(S): at 05:37

## 2017-08-12 RX ADMIN — MAGNESIUM HYDROXIDE 30 MILLILITER(S): 400 TABLET, CHEWABLE ORAL at 11:55

## 2017-08-12 RX ADMIN — RALOXIFENE HYDROCHLORIDE 60 MILLIGRAM(S): 60 TABLET, COATED ORAL at 11:19

## 2017-08-12 RX ADMIN — Medication 650 MILLIGRAM(S): at 17:11

## 2017-08-12 RX ADMIN — Medication 162 MILLIGRAM(S): at 17:10

## 2017-08-12 RX ADMIN — Medication 650 MILLIGRAM(S): at 00:31

## 2017-08-12 RX ADMIN — POLYETHYLENE GLYCOL 3350 17 GRAM(S): 17 POWDER, FOR SOLUTION ORAL at 11:19

## 2017-08-12 RX ADMIN — ATENOLOL 50 MILLIGRAM(S): 25 TABLET ORAL at 17:14

## 2017-08-12 RX ADMIN — HYDROMORPHONE HYDROCHLORIDE 2 MILLIGRAM(S): 2 INJECTION INTRAMUSCULAR; INTRAVENOUS; SUBCUTANEOUS at 02:37

## 2017-08-12 RX ADMIN — Medication 650 MILLIGRAM(S): at 05:45

## 2017-08-12 RX ADMIN — OXYCODONE HYDROCHLORIDE 10 MILLIGRAM(S): 5 TABLET ORAL at 05:37

## 2017-08-12 RX ADMIN — Medication 300 MILLIGRAM(S): at 11:19

## 2017-08-12 RX ADMIN — OXYCODONE HYDROCHLORIDE 10 MILLIGRAM(S): 5 TABLET ORAL at 17:10

## 2017-08-12 RX ADMIN — HYDROMORPHONE HYDROCHLORIDE 2 MILLIGRAM(S): 2 INJECTION INTRAMUSCULAR; INTRAVENOUS; SUBCUTANEOUS at 01:37

## 2017-08-12 NOTE — PROGRESS NOTE ADULT - SUBJECTIVE AND OBJECTIVE BOX
SARAH WOLFE  [id number]    Patient seen and examined status post left total knee arthroplasty, POD # 2. Patient is doing well and is comfortable. The patient's pain is controlled using the prescribed pain medications. The patient is participating in physical therapy. Denies nausea, vomiting, chest pain, shortness of breath, abdominal pain, LE N/T. + flatus, -BM No new complaints.    HV drain 50cc/24hrs                            10.4   10.0  )-----------( 189      ( 12 Aug 2017 05:24 )             31.1     08-12    141  |  102  |  21.0<H>  ----------------------------<  102  4.0   |  29.0  |  0.96    Ca    8.2<L>      12 Aug 2017 05:24  Mg     1.9     08-12        MEDICATIONS  (STANDING):  allopurinol 300 milliGRAM(s) Oral daily  amLODIPine   Tablet 10 milliGRAM(s) Oral daily  valsartan 320 milliGRAM(s) Oral daily  raloxifene 60 milliGRAM(s) Oral daily  hydrALAZINE 50 milliGRAM(s) Oral two times a day  lactated ringers. 1000 milliLiter(s) (85 mL/Hr) IV Continuous <Continuous>  aspirin enteric coated 162 milliGRAM(s) Oral two times a day  acetaminophen   Tablet. 650 milliGRAM(s) Oral every 6 hours  polyethylene glycol 3350 17 Gram(s) Oral daily  docusate sodium 100 milliGRAM(s) Oral three times a day  oxyCODONE  ER Tablet 10 milliGRAM(s) Oral every 12 hours  hydrochlorothiazide 25 milliGRAM(s) Oral daily  ATENolol  Tablet 50 milliGRAM(s) Oral two times a day    MEDICATIONS  (PRN):  acetaminophen   Tablet 650 milliGRAM(s) Oral every 6 hours PRN For Temp over 38.3 C (100.94 F)  oxyCODONE    IR 5 milliGRAM(s) Oral every 3 hours PRN Mild Pain  oxyCODONE    IR 10 milliGRAM(s) Oral every 3 hours PRN Moderate Pain  HYDROmorphone  Injectable 0.5 milliGRAM(s) IV Push every 3 hours PRN breakthrough pain  aluminum hydroxide/magnesium hydroxide/simethicone Suspension 30 milliLiter(s) Oral four times a day PRN Indigestion  ondansetron Injectable 4 milliGRAM(s) IV Push every 6 hours PRN Nausea and/or Vomiting  magnesium hydroxide Suspension 30 milliLiter(s) Oral daily PRN Constipation  bisacodyl Suppository 10 milliGRAM(s) Rectal daily PRN If no bowel movement by postoperative day #2  senna 2 Tablet(s) Oral at bedtime PRN Constipation  HYDROmorphone   Tablet 2 milliGRAM(s) Oral every 3 hours PRN Severe Pain (7 - 10)      Physical exam: The left knee dressing changed, incision is clean, dry and intact. No drainage or discharge. No erythema is noted. No blistering. No ecchymosis. Drain DC'd, no active draining from drain site. The calf is supple nontender. Passive range of motion is acceptable to due postoperative pain. No calf tenderness. Sensation to light touch is grossly intact distally. Motor function distally is 5/5. Extensor hallucis longus and flexor hallucis longus are intact. No foot drop. 2+ dorsalis pedis pulse. Capillary refill is less than 2 seconds. No cyanosis.    Primary Orthopedic Assessment:  •	s/p LEFT total knee replacement    Secondary  Orthopedic Assessment(s):   •	    Secondary  Medical Assessment(s):   •	    Plan:   •	DVT prophylaxis: ASA 162mg bid, use of compression devices and ankle pumps  •	Continue physical therapy  •	Weightbearing as tolerated of the right lower extremity with assistance of a walker  •	Incentive spirometry encouraged  •	Pain control as clinically indicated  •	Discharge planning – anticipated discharge subacute rehabilitation today

## 2017-08-12 NOTE — PROGRESS NOTE ADULT - SUBJECTIVE AND OBJECTIVE BOX
Patient seen and examined . S/p L TKA , POD # 2    CC : L knee pain     HPI:      PAST MEDICAL & SURGICAL HISTORY:  Osteoporosis  Gout  Osteoarthritis  Hyperlipidemia  HTN (hypertension)  No significant past surgical history      MEDICATIONS  (STANDING):  allopurinol 300 milliGRAM(s) Oral daily  amLODIPine   Tablet 10 milliGRAM(s) Oral daily  valsartan 320 milliGRAM(s) Oral daily  raloxifene 60 milliGRAM(s) Oral daily  hydrALAZINE 50 milliGRAM(s) Oral two times a day  lactated ringers. 1000 milliLiter(s) (85 mL/Hr) IV Continuous <Continuous>  aspirin enteric coated 162 milliGRAM(s) Oral two times a day  acetaminophen   Tablet. 650 milliGRAM(s) Oral every 6 hours  polyethylene glycol 3350 17 Gram(s) Oral daily  docusate sodium 100 milliGRAM(s) Oral three times a day  oxyCODONE  ER Tablet 10 milliGRAM(s) Oral every 12 hours  hydrochlorothiazide 25 milliGRAM(s) Oral daily  ATENolol  Tablet 50 milliGRAM(s) Oral two times a day  magnesium citrate Solution 150 milliLiter(s) Oral once    MEDICATIONS  (PRN):  acetaminophen   Tablet 650 milliGRAM(s) Oral every 6 hours PRN For Temp over 38.3 C (100.94 F)  oxyCODONE    IR 5 milliGRAM(s) Oral every 3 hours PRN Mild Pain  oxyCODONE    IR 10 milliGRAM(s) Oral every 3 hours PRN Moderate Pain  HYDROmorphone  Injectable 0.5 milliGRAM(s) IV Push every 3 hours PRN breakthrough pain  aluminum hydroxide/magnesium hydroxide/simethicone Suspension 30 milliLiter(s) Oral four times a day PRN Indigestion  ondansetron Injectable 4 milliGRAM(s) IV Push every 6 hours PRN Nausea and/or Vomiting  magnesium hydroxide Suspension 30 milliLiter(s) Oral daily PRN Constipation  bisacodyl Suppository 10 milliGRAM(s) Rectal daily PRN If no bowel movement by postoperative day #2  senna 2 Tablet(s) Oral at bedtime PRN Constipation  HYDROmorphone   Tablet 2 milliGRAM(s) Oral every 3 hours PRN Severe Pain (7 - 10)      LABS:                          10.4   10.0  )-----------( 189      ( 12 Aug 2017 05:24 )             31.1     08-12    141  |  102  |  21.0<H>  ----------------------------<  102  4.0   |  29.0  |  0.96    Ca    8.2<L>      12 Aug 2017 05:24  Mg     1.9     08-12        REVIEW OF SYSTEMS:    CONSTITUTIONAL: No fever, weight loss, or fatigue  EYES: No eye pain, visual disturbances, or discharge  ENMT:  No difficulty hearing, tinnitus, vertigo; No sinus or throat pain  NECK: No pain or stiffness  RESPIRATORY: No cough, wheezing, chills or hemoptysis; No shortness of breath  CARDIOVASCULAR: No chest pain, palpitations, dizziness, or leg swelling  GASTROINTESTINAL: No abdominal or epigastric pain. No nausea, vomiting, or hematemesis; No diarrhea or constipation. No melena or hematochezia.  GENITOURINARY: No dysuria, frequency, hematuria, or incontinence  NEUROLOGICAL: No headaches, memory loss, loss of strength, numbness, or tremors  SKIN: No itching, burning, rashes, or lesions   LYMPH NODES: No enlarged glands  ENDOCRINE: No heat or cold intolerance; No hair loss  MUSCULOSKELETAL: L knee pain , well controlled   PSYCHIATRIC: No depression, anxiety, mood swings, or difficulty sleeping  HEME/LYMPH: No easy bruising, or bleeding gums  ALLERGY AND IMMUNOLOGIC: No hives or eczema    Vital Signs Last 24 Hrs  T(C): 36.9 (12 Aug 2017 07:38), Max: 37.1 (11 Aug 2017 23:29)  T(F): 98.4 (12 Aug 2017 07:38), Max: 98.7 (11 Aug 2017 23:29)  HR: 65 (12 Aug 2017 07:38) (55 - 76)  BP: 141/67 (12 Aug 2017 07:38) (117/58 - 148/59)  BP(mean): --  RR: 18 (12 Aug 2017 07:38) (17 - 18)  SpO2: 100% (12 Aug 2017 07:38) (94% - 100%)  PHYSICAL EXAM:    GENERAL: NAD, well-groomed, well-developed  HEAD:  Atraumatic, Normocephalic  EYES: EOMI, PERRLA, conjunctiva and sclera clear  NECK: Supple, No JVD, Normal thyroid  NERVOUS SYSTEM:  Alert & Oriented X3, no focal deficit  CHEST/LUNG: CTA b/l ,  no  rales, rhonchi, wheezing, or rubs  HEART: Regular rate and rhythm; No murmurs, rubs, or gallops  ABDOMEN: Soft, Nontender, Nondistended; Bowel sounds present  EXTREMITIES:  2+ Peripheral Pulses, No clubbing, cyanosis, or edema , L knee dressing + , clear and dry   LYMPH: No lymphadenopathy noted  SKIN: No rashes or lesions Patient seen and examined . S/p L TKA , POD # 2 , pain well controlled , no BM X 3 DAYS     CC : L knee pain , no BM for 3 days       PAST MEDICAL & SURGICAL HISTORY:  Osteoporosis  Gout  Osteoarthritis  Hyperlipidemia  HTN (hypertension)  No significant past surgical history      MEDICATIONS  (STANDING):  allopurinol 300 milliGRAM(s) Oral daily  amLODIPine   Tablet 10 milliGRAM(s) Oral daily  valsartan 320 milliGRAM(s) Oral daily  raloxifene 60 milliGRAM(s) Oral daily  hydrALAZINE 50 milliGRAM(s) Oral two times a day  lactated ringers. 1000 milliLiter(s) (85 mL/Hr) IV Continuous <Continuous>  aspirin enteric coated 162 milliGRAM(s) Oral two times a day  acetaminophen   Tablet. 650 milliGRAM(s) Oral every 6 hours  polyethylene glycol 3350 17 Gram(s) Oral daily  docusate sodium 100 milliGRAM(s) Oral three times a day  oxyCODONE  ER Tablet 10 milliGRAM(s) Oral every 12 hours  hydrochlorothiazide 25 milliGRAM(s) Oral daily  ATENolol  Tablet 50 milliGRAM(s) Oral two times a day  magnesium citrate Solution 150 milliLiter(s) Oral once    MEDICATIONS  (PRN):  acetaminophen   Tablet 650 milliGRAM(s) Oral every 6 hours PRN For Temp over 38.3 C (100.94 F)  oxyCODONE    IR 5 milliGRAM(s) Oral every 3 hours PRN Mild Pain  oxyCODONE    IR 10 milliGRAM(s) Oral every 3 hours PRN Moderate Pain  HYDROmorphone  Injectable 0.5 milliGRAM(s) IV Push every 3 hours PRN breakthrough pain  aluminum hydroxide/magnesium hydroxide/simethicone Suspension 30 milliLiter(s) Oral four times a day PRN Indigestion  ondansetron Injectable 4 milliGRAM(s) IV Push every 6 hours PRN Nausea and/or Vomiting  magnesium hydroxide Suspension 30 milliLiter(s) Oral daily PRN Constipation  bisacodyl Suppository 10 milliGRAM(s) Rectal daily PRN If no bowel movement by postoperative day #2  senna 2 Tablet(s) Oral at bedtime PRN Constipation  HYDROmorphone   Tablet 2 milliGRAM(s) Oral every 3 hours PRN Severe Pain (7 - 10)      LABS:                          10.4   10.0  )-----------( 189      ( 12 Aug 2017 05:24 )             31.1     08-12    141  |  102  |  21.0<H>  ----------------------------<  102  4.0   |  29.0  |  0.96    Ca    8.2<L>      12 Aug 2017 05:24  Mg     1.9     08-12        REVIEW OF SYSTEMS:    CONSTITUTIONAL: No fever, weight loss, or fatigue  EYES: No eye pain, visual disturbances, or discharge  ENMT:  No difficulty hearing, tinnitus, vertigo; No sinus or throat pain  NECK: No pain or stiffness  RESPIRATORY: No cough, wheezing, chills or hemoptysis; No shortness of breath  CARDIOVASCULAR: No chest pain, palpitations, dizziness, or leg swelling  GASTROINTESTINAL: No abdominal or epigastric pain. No nausea, vomiting, or hematemesis; No diarrhea or constipation. No melena or hematochezia.  GENITOURINARY: No dysuria, frequency, hematuria,  incontinence + but resolving   NEUROLOGICAL: No headaches, memory loss, loss of strength, numbness, or tremors  SKIN: No itching, burning, rashes, or lesions   LYMPH NODES: No enlarged glands  ENDOCRINE: No heat or cold intolerance; No hair loss  MUSCULOSKELETAL: L knee pain , well controlled   PSYCHIATRIC: No depression, anxiety, mood swings, or difficulty sleeping  HEME/LYMPH: No easy bruising, or bleeding gums  ALLERGY AND IMMUNOLOGIC: No hives or eczema    Vital Signs Last 24 Hrs  T(C): 36.9 (12 Aug 2017 07:38), Max: 37.1 (11 Aug 2017 23:29)  T(F): 98.4 (12 Aug 2017 07:38), Max: 98.7 (11 Aug 2017 23:29)  HR: 65 (12 Aug 2017 07:38) (55 - 76)  BP: 141/67 (12 Aug 2017 07:38) (117/58 - 148/59)  BP(mean): --  RR: 18 (12 Aug 2017 07:38) (17 - 18)  SpO2: 100% (12 Aug 2017 07:38) (94% - 100%)  PHYSICAL EXAM:    GENERAL: NAD, well-groomed, well-developed  HEAD:  Atraumatic, Normocephalic  EYES: EOMI, PERRLA, conjunctiva and sclera clear  NECK: Supple, No JVD, Normal thyroid  NERVOUS SYSTEM:  Alert & Oriented X3, no focal deficit  CHEST/LUNG: CTA b/l ,  no  rales, rhonchi, wheezing, or rubs  HEART: Regular rate and rhythm; No murmurs, rubs, or gallops  ABDOMEN: Soft, Nontender, Nondistended; Bowel sounds present  EXTREMITIES:  2+ Peripheral Pulses, No clubbing, cyanosis, or edema , L knee dressing + , clear and dry   LYMPH: No lymphadenopathy noted  SKIN: No rashes or lesions

## 2017-08-12 NOTE — PROGRESS NOTE ADULT - ASSESSMENT
82F with H/O HTN, HLD,  osteoarthritis of the left knee .  Has had pain "for some years now" and now has gotten so severe, for surgical repair. Now S/P left knee replacement, POD# 2. Post operative had urinary incontinence.       Problem/Plan - 1:  ·  Problem: Osteoarthritis.  Plan: s/p Lt knee replacement   ct pain control  Bowel regimen  DVT px - high risk  incentive spirometry  PT.     Problem/Plan - 2:  ·  Problem: PVC's (premature ventricular contractions).  Plan: stable      Problem/Plan - 3:  ·  Problem: Urinary incontinence.  Plan: most likely 2/2 spinal drugs  Improving - observe, check UA   r/o UTI if continues  treat constipation.     Problem/Plan - 4:  ·  Problem: HTN (hypertension).  Plan: ct home meds.     Problem/Plan - 5:  ·  Problem: Hyperlipidemia.  Plan: ct home meds.     Problem/Plan - 6:  Problem: Constipation. Plan: ct bowel regimen. 82F with H/O HTN, HLD,  osteoarthritis of the left knee .  Has had pain "for some years now" and now has gotten so severe, for surgical repair. Now S/P left knee replacement, POD# 2. Post operative had urinary incontinence , now resolving , no BM for 3 days .      Problem/Plan - 1:  ·  Problem: Osteoarthritis.  Plan: s/p Lt knee replacement   ct pain control  Bowel regimen  DVT px - high risk  incentive spirometry  PT.     Problem/Plan - 2:  ·  Problem: PVC's (premature ventricular contractions).  Plan: stable      Problem/Plan - 3:  ·  Problem: Urinary incontinence.  Plan: resolving     Problem/Plan - 4:  ·  Problem: HTN (hypertension).  Plan: ct home meds.     Problem/Plan - 5:  ·  Problem: Hyperlipidemia.  Plan: ct home meds.     Problem/Plan - 6:  Problem: Constipation. Plan: ct bowel regimen, give MOM now .

## 2017-08-15 PROBLEM — Z86.79 HISTORY OF HYPERTENSION: Status: RESOLVED | Noted: 2017-06-09 | Resolved: 2017-08-15

## 2017-08-15 PROBLEM — Z87.39 HISTORY OF OSTEOPOROSIS: Status: RESOLVED | Noted: 2017-06-09 | Resolved: 2017-08-15

## 2017-08-15 PROBLEM — Z87.39 HISTORY OF ARTHRITIS: Status: RESOLVED | Noted: 2017-06-09 | Resolved: 2017-08-15

## 2017-08-15 PROBLEM — Z78.9 DOES NOT USE ILLICIT DRUGS: Status: ACTIVE | Noted: 2017-06-09

## 2017-08-16 LAB — SURGICAL PATHOLOGY FINAL REPORT - CH: SIGNIFICANT CHANGE UP

## 2017-08-25 ENCOUNTER — APPOINTMENT (OUTPATIENT)
Dept: ORTHOPEDIC SURGERY | Facility: CLINIC | Age: 82
End: 2017-08-25
Payer: MEDICARE

## 2017-08-25 VITALS
SYSTOLIC BLOOD PRESSURE: 159 MMHG | HEART RATE: 64 BPM | HEIGHT: 64 IN | BODY MASS INDEX: 36.19 KG/M2 | WEIGHT: 212 LBS | DIASTOLIC BLOOD PRESSURE: 62 MMHG

## 2017-08-25 PROCEDURE — 73562 X-RAY EXAM OF KNEE 3: CPT | Mod: LT

## 2017-08-25 PROCEDURE — 99024 POSTOP FOLLOW-UP VISIT: CPT

## 2017-08-25 RX ORDER — CALCIUM CITRATE 500 MG
TABLET, EFFERVESCENT ORAL
Refills: 0 | Status: ACTIVE | COMMUNITY

## 2017-08-25 RX ORDER — ALLOPURINOL 200 MG/1
TABLET ORAL
Refills: 0 | Status: ACTIVE | COMMUNITY

## 2017-08-25 RX ORDER — NAPROXEN 500 MG/1
TABLET ORAL
Refills: 0 | Status: ACTIVE | COMMUNITY

## 2017-08-25 RX ORDER — HYDRALAZINE HYDROCHLORIDE 20 MG/ML
INJECTION, SOLUTION INTRAMUSCULAR; INTRAVENOUS
Refills: 0 | Status: ACTIVE | COMMUNITY

## 2017-08-25 RX ORDER — ROSUVASTATIN CALCIUM 5 MG/1
TABLET, FILM COATED ORAL
Refills: 0 | Status: ACTIVE | COMMUNITY

## 2017-08-25 RX ORDER — ASPIRIN 81 MG/1
81 TABLET, CHEWABLE ORAL
Refills: 0 | Status: ACTIVE | COMMUNITY

## 2017-08-25 RX ORDER — ATENOLOL 50 MG/1
TABLET ORAL
Refills: 0 | Status: ACTIVE | COMMUNITY

## 2017-08-25 RX ORDER — RALOXIFENE HYDROCHLORIDE 60 MG/1
TABLET ORAL
Refills: 0 | Status: ACTIVE | COMMUNITY

## 2017-08-25 RX ORDER — AMLODIPINE BESYLATE AND VALSARTAN 10; 320 MG/1; MG/1
TABLET, FILM COATED ORAL
Refills: 0 | Status: ACTIVE | COMMUNITY

## 2017-09-06 ENCOUNTER — CHART COPY (OUTPATIENT)
Age: 82
End: 2017-09-06

## 2017-09-06 RX ORDER — OXYCODONE 5 MG/1
5 TABLET ORAL
Qty: 20 | Refills: 0 | Status: ACTIVE | COMMUNITY
Start: 2017-09-06 | End: 1900-01-01

## 2017-09-13 ENCOUNTER — CHART COPY (OUTPATIENT)
Age: 82
End: 2017-09-13

## 2017-09-13 RX ORDER — OXYCODONE 5 MG/1
5 TABLET ORAL
Qty: 40 | Refills: 0 | Status: ACTIVE | COMMUNITY
Start: 2017-09-13 | End: 1900-01-01

## 2017-09-15 ENCOUNTER — APPOINTMENT (OUTPATIENT)
Dept: ORTHOPEDIC SURGERY | Facility: CLINIC | Age: 82
End: 2017-09-15
Payer: MEDICARE

## 2017-09-15 VITALS
SYSTOLIC BLOOD PRESSURE: 187 MMHG | BODY MASS INDEX: 36.19 KG/M2 | DIASTOLIC BLOOD PRESSURE: 70 MMHG | HEART RATE: 79 BPM | HEIGHT: 64 IN | WEIGHT: 212 LBS

## 2017-09-15 PROCEDURE — 99024 POSTOP FOLLOW-UP VISIT: CPT

## 2017-09-29 ENCOUNTER — APPOINTMENT (OUTPATIENT)
Dept: ORTHOPEDIC SURGERY | Facility: CLINIC | Age: 82
End: 2017-09-29

## 2017-10-02 ENCOUNTER — CHART COPY (OUTPATIENT)
Age: 82
End: 2017-10-02

## 2017-10-02 RX ORDER — OXYCODONE 5 MG/1
5 TABLET ORAL
Qty: 60 | Refills: 0 | Status: ACTIVE | COMMUNITY
Start: 2017-10-02 | End: 1900-01-01

## 2017-10-02 RX ORDER — OXYCODONE 5 MG/1
5 TABLET ORAL
Qty: 40 | Refills: 0 | Status: ACTIVE | COMMUNITY
Start: 2017-10-02 | End: 1900-01-01

## 2017-10-26 PROBLEM — Z47.1 AFTERCARE FOLLOWING LEFT KNEE JOINT REPLACEMENT SURGERY: Status: ACTIVE | Noted: 2017-08-24

## 2017-10-27 ENCOUNTER — APPOINTMENT (OUTPATIENT)
Dept: ORTHOPEDIC SURGERY | Facility: CLINIC | Age: 82
End: 2017-10-27
Payer: MEDICARE

## 2017-10-27 VITALS
DIASTOLIC BLOOD PRESSURE: 61 MMHG | BODY MASS INDEX: 36.19 KG/M2 | WEIGHT: 212 LBS | SYSTOLIC BLOOD PRESSURE: 188 MMHG | HEIGHT: 64 IN | HEART RATE: 72 BPM

## 2017-10-27 DIAGNOSIS — Z47.1 AFTERCARE FOLLOWING JOINT REPLACEMENT SURGERY: ICD-10-CM

## 2017-10-27 DIAGNOSIS — Z96.652 AFTERCARE FOLLOWING JOINT REPLACEMENT SURGERY: ICD-10-CM

## 2017-10-27 PROCEDURE — 73562 X-RAY EXAM OF KNEE 3: CPT | Mod: LT

## 2017-10-27 PROCEDURE — 99024 POSTOP FOLLOW-UP VISIT: CPT

## 2017-12-22 ENCOUNTER — CHART COPY (OUTPATIENT)
Age: 82
End: 2017-12-22

## 2017-12-22 RX ORDER — OXYCODONE 5 MG/1
5 TABLET ORAL
Qty: 30 | Refills: 0 | Status: ACTIVE | COMMUNITY
Start: 2017-12-22 | End: 1900-01-01

## 2018-02-16 ENCOUNTER — APPOINTMENT (OUTPATIENT)
Dept: ORTHOPEDIC SURGERY | Facility: CLINIC | Age: 83
End: 2018-02-16
Payer: MEDICARE

## 2018-02-16 VITALS
HEIGHT: 64 IN | TEMPERATURE: 98.4 F | BODY MASS INDEX: 35.51 KG/M2 | WEIGHT: 208 LBS | HEART RATE: 64 BPM | DIASTOLIC BLOOD PRESSURE: 62 MMHG | SYSTOLIC BLOOD PRESSURE: 158 MMHG

## 2018-02-16 PROCEDURE — 99213 OFFICE O/P EST LOW 20 MIN: CPT | Mod: 25

## 2018-02-16 PROCEDURE — 73562 X-RAY EXAM OF KNEE 3: CPT | Mod: LT

## 2018-02-16 PROCEDURE — 20610 DRAIN/INJ JOINT/BURSA W/O US: CPT | Mod: RT

## 2021-05-26 NOTE — ASU PREOP CHECKLIST - SELECT TESTS ORDERED
Patient arrives to the ER via EMS c/o suprapubic cath problem. Patient states he was at his facility trying to change something on this catheter when a piece of it came off. Patient doesn't have any other complaints at this time and denies any COVID exposure   
Type and Screen

## 2022-01-14 NOTE — DISCHARGE NOTE ADULT - BECAUSE OF A PHYSICAL, MENTAL OR EMOTIONAL CONDITION, DO YOU HAVE DIFFICULTY DOING  ERRANDS ALONE LIKE VISITING A DOCTOR'S OFFICE OR SHOPPING (15 YEARS AND OLDER)
Yes Spiral Flap Text: The defect edges were debeveled with a #15 scalpel blade.  Given the location of the defect, shape of the defect and the proximity to free margins a spiral flap was deemed most appropriate.  Using a sterile surgical marker, an appropriate rotation flap was drawn incorporating the defect and placing the expected incisions within the relaxed skin tension lines where possible. The area thus outlined was incised deep to adipose tissue with a #15 scalpel blade.  The skin margins were undermined to an appropriate distance in all directions utilizing iris scissors.

## 2022-08-09 ENCOUNTER — RX ONLY (RX ONLY)
Age: 87
End: 2022-08-09

## 2022-08-09 ENCOUNTER — OFFICE (OUTPATIENT)
Dept: URBAN - METROPOLITAN AREA CLINIC 6 | Facility: CLINIC | Age: 87
Setting detail: OPHTHALMOLOGY
End: 2022-08-09
Payer: MEDICARE

## 2022-08-09 DIAGNOSIS — H01.002: ICD-10-CM

## 2022-08-09 DIAGNOSIS — H01.004: ICD-10-CM

## 2022-08-09 DIAGNOSIS — H01.001: ICD-10-CM

## 2022-08-09 DIAGNOSIS — H40.053: ICD-10-CM

## 2022-08-09 DIAGNOSIS — H01.005: ICD-10-CM

## 2022-08-09 DIAGNOSIS — H25.11: ICD-10-CM

## 2022-08-09 PROCEDURE — 99204 OFFICE O/P NEW MOD 45 MIN: CPT | Performed by: OPHTHALMOLOGY

## 2022-08-09 PROCEDURE — 92083 EXTENDED VISUAL FIELD XM: CPT | Performed by: OPHTHALMOLOGY

## 2022-08-09 PROCEDURE — 92250 FUNDUS PHOTOGRAPHY W/I&R: CPT | Performed by: OPHTHALMOLOGY

## 2022-08-09 PROCEDURE — 76514 ECHO EXAM OF EYE THICKNESS: CPT | Performed by: OPHTHALMOLOGY

## 2022-08-09 PROCEDURE — 76512 OPH US DX B-SCAN: CPT | Performed by: OPHTHALMOLOGY

## 2022-08-09 ASSESSMENT — KERATOMETRY
OS_K1POWER_DIOPTERS: 43.50
OS_AXISANGLE_DEGREES: 001
METHOD_AUTO_MANUAL: AUTO
OS_K2POWER_DIOPTERS: 44.75

## 2022-08-09 ASSESSMENT — REFRACTION_CURRENTRX
OD_ADD: +2.25
OS_OVR_VA: 20/
OS_ADD: +2.25
OD_CYLINDER: -0.50
OD_SPHERE: +2.00
OD_OVR_VA: 20/
OD_AXIS: 060
OS_CYLINDER: SPH
OD_VPRISM_DIRECTION: BF
OS_VPRISM_DIRECTION: BF
OS_SPHERE: PL

## 2022-08-09 ASSESSMENT — REFRACTION_MANIFEST
OD_AXIS: 060
OD_VA1: 20/400
OS_AXIS: 095
OS_CYLINDER: -1.00
OS_VA1: 20/25-2
OS_SPHERE: +0.50
OD_CYLINDER: -0.50
OD_SPHERE: +2.00

## 2022-08-09 ASSESSMENT — AXIALLENGTH_DERIVED
OS_AL: 23.3645
OS_AL: 23.3645

## 2022-08-09 ASSESSMENT — REFRACTION_AUTOREFRACTION
OS_AXIS: 095
OS_CYLINDER: -1.00
OS_SPHERE: +0.50

## 2022-08-09 ASSESSMENT — TONOMETRY
OS_IOP_MMHG: 18
OD_IOP_MMHG: 18

## 2022-08-09 ASSESSMENT — CONFRONTATIONAL VISUAL FIELD TEST (CVF)
OD_FINDINGS: FULL
OS_FINDINGS: FULL

## 2022-08-09 ASSESSMENT — SPHEQUIV_DERIVED
OS_SPHEQUIV: 0
OS_SPHEQUIV: 0
OD_SPHEQUIV: 1.75

## 2022-08-09 ASSESSMENT — VISUAL ACUITY
OD_BCVA: 20/25-2
OS_BCVA: 20/400

## 2022-08-09 ASSESSMENT — PACHYMETRY
OD_CT_UM: 510
OD_CT_CORRECTION: 2
OS_CT_CORRECTION: 3
OS_CT_UM: 506

## 2022-08-09 ASSESSMENT — LID EXAM ASSESSMENTS
OD_BLEPHARITIS: RLL RUL
OS_BLEPHARITIS: LLL LUL

## 2022-10-21 ENCOUNTER — OFFICE (OUTPATIENT)
Dept: URBAN - METROPOLITAN AREA CLINIC 94 | Facility: CLINIC | Age: 87
Setting detail: OPHTHALMOLOGY
End: 2022-10-21
Payer: MEDICARE

## 2022-10-21 DIAGNOSIS — Z20.822: ICD-10-CM

## 2022-10-21 DIAGNOSIS — Z01.812: ICD-10-CM

## 2022-10-21 PROCEDURE — 99211 OFF/OP EST MAY X REQ PHY/QHP: CPT | Performed by: OPHTHALMOLOGY

## 2022-10-22 ENCOUNTER — OFFICE (OUTPATIENT)
Dept: URBAN - METROPOLITAN AREA CLINIC 6 | Facility: CLINIC | Age: 87
Setting detail: OPHTHALMOLOGY
End: 2022-10-22
Payer: MEDICARE

## 2022-10-22 DIAGNOSIS — H25.11: ICD-10-CM

## 2022-10-22 PROCEDURE — 99213 OFFICE O/P EST LOW 20 MIN: CPT | Performed by: OPHTHALMOLOGY

## 2022-10-22 PROCEDURE — 92136 OPHTHALMIC BIOMETRY: CPT | Performed by: OPHTHALMOLOGY

## 2022-10-22 ASSESSMENT — REFRACTION_AUTOREFRACTION
OS_AXIS: 095
OS_CYLINDER: -1.00
OS_SPHERE: +0.50

## 2022-10-22 ASSESSMENT — REFRACTION_CURRENTRX
OD_SPHERE: +2.00
OD_CYLINDER: -0.50
OD_OVR_VA: 20/
OS_CYLINDER: SPH
OD_VPRISM_DIRECTION: BF
OS_OVR_VA: 20/
OS_VPRISM_DIRECTION: BF
OD_AXIS: 060
OS_SPHERE: PL
OS_ADD: +2.25
OD_ADD: +2.25

## 2022-10-22 ASSESSMENT — KERATOMETRY
OD_K2POWER_DIOPTERS: 5.00
OD_AXISANGLE_DEGREES: 176
METHOD_AUTO_MANUAL: AUTO
OS_K1POWER_DIOPTERS: 41.50
OS_K2POWER_DIOPTERS: 44.50
OD_K1POWER_DIOPTERS: 44.00
OS_AXISANGLE_DEGREES: 179

## 2022-10-22 ASSESSMENT — REFRACTION_MANIFEST
OS_VA1: 20/25-2
OS_SPHERE: +0.50
OD_AXIS: 060
OD_VA1: 20/400
OD_CYLINDER: -0.50
OS_AXIS: 095
OD_SPHERE: +2.00
OS_CYLINDER: -1.00

## 2022-10-22 ASSESSMENT — SPHEQUIV_DERIVED
OS_SPHEQUIV: 0
OS_SPHEQUIV: 0
OD_SPHEQUIV: 1.75

## 2022-10-22 ASSESSMENT — AXIALLENGTH_DERIVED
OS_AL: 23.777
OS_AL: 23.777
OD_AL: 32.18

## 2022-10-22 ASSESSMENT — LID EXAM ASSESSMENTS
OD_BLEPHARITIS: RLL RUL
OS_BLEPHARITIS: LLL LUL

## 2022-10-22 ASSESSMENT — PACHYMETRY
OD_CT_UM: 510
OD_CT_CORRECTION: 2
OS_CT_UM: 506
OS_CT_CORRECTION: 3

## 2022-10-22 ASSESSMENT — TONOMETRY: OD_IOP_MMHG: 20

## 2022-10-22 ASSESSMENT — CONFRONTATIONAL VISUAL FIELD TEST (CVF)
OS_FINDINGS: FULL
OD_FINDINGS: FULL

## 2022-10-22 ASSESSMENT — VISUAL ACUITY
OS_BCVA: 20/500
OD_BCVA: 20/25-

## 2022-10-24 ENCOUNTER — ASC (OUTPATIENT)
Dept: URBAN - METROPOLITAN AREA SURGERY 8 | Facility: SURGERY | Age: 87
Setting detail: OPHTHALMOLOGY
End: 2022-10-24
Payer: MEDICARE

## 2022-10-24 DIAGNOSIS — H52.211: ICD-10-CM

## 2022-10-24 DIAGNOSIS — H25.11: ICD-10-CM

## 2022-10-24 PROCEDURE — FEMTO CATARACT LASER: Performed by: OPHTHALMOLOGY

## 2022-10-24 PROCEDURE — 66984 XCAPSL CTRC RMVL W/O ECP: CPT | Performed by: OPHTHALMOLOGY

## 2022-10-24 PROCEDURE — A9270 NON-COVERED ITEM OR SERVICE: HCPCS | Performed by: OPHTHALMOLOGY

## 2022-10-25 ENCOUNTER — RX ONLY (RX ONLY)
Age: 87
End: 2022-10-25

## 2022-10-25 ENCOUNTER — OFFICE (OUTPATIENT)
Dept: URBAN - METROPOLITAN AREA CLINIC 6 | Facility: CLINIC | Age: 87
Setting detail: OPHTHALMOLOGY
End: 2022-10-25
Payer: MEDICARE

## 2022-10-25 DIAGNOSIS — Z96.1: ICD-10-CM

## 2022-10-25 PROCEDURE — 99024 POSTOP FOLLOW-UP VISIT: CPT | Performed by: OPHTHALMOLOGY

## 2022-10-25 ASSESSMENT — PACHYMETRY
OD_CT_CORRECTION: 2
OD_CT_UM: 510
OS_CT_CORRECTION: 3
OS_CT_UM: 506

## 2022-10-25 ASSESSMENT — TONOMETRY
OS_IOP_MMHG: 16
OD_IOP_MMHG: 19

## 2022-10-25 ASSESSMENT — CORNEAL EDEMA - FOLDS/STRIAE: OD_FOLDSSTRIAE: 2+

## 2022-10-25 ASSESSMENT — LID EXAM ASSESSMENTS
OS_BLEPHARITIS: LLL LUL
OD_BLEPHARITIS: RLL RUL

## 2022-10-25 ASSESSMENT — CONFRONTATIONAL VISUAL FIELD TEST (CVF)
OS_FINDINGS: FULL
OD_FINDINGS: FULL

## 2022-10-26 ASSESSMENT — REFRACTION_MANIFEST
OD_VA1: 20/400
OS_AXIS: 095
OD_SPHERE: +2.00
OS_SPHERE: +0.50
OD_SPHERE: +2.00
OS_AXIS: 095
OS_VA1: 20/25-2
OS_CYLINDER: -1.00
OS_VA1: 20/25-2
OD_AXIS: 060
OS_SPHERE: +0.50
OD_CYLINDER: -0.50
OD_AXIS: 060
OD_VA1: 20/400
OS_CYLINDER: -1.00
OD_CYLINDER: -0.50

## 2022-10-26 ASSESSMENT — REFRACTION_AUTOREFRACTION
OS_AXIS: 45
OS_SPHERE: PLANO
OS_CYLINDER: -0.75
OS_AXIS: 45
OS_CYLINDER: -0.75
OS_SPHERE: PLANO

## 2022-10-26 ASSESSMENT — SPHEQUIV_DERIVED
OS_SPHEQUIV: 0
OD_SPHEQUIV: 1.75
OD_SPHEQUIV: 1.75
OS_SPHEQUIV: 0

## 2022-10-26 ASSESSMENT — REFRACTION_CURRENTRX
OS_VPRISM_DIRECTION: BF
OD_VPRISM_DIRECTION: BF
OS_ADD: +2.25
OS_CYLINDER: SPH
OD_ADD: +2.25
OS_VPRISM_DIRECTION: BF
OD_ADD: +2.25
OS_SPHERE: PL
OD_CYLINDER: -0.50
OD_CYLINDER: -0.50
OD_OVR_VA: 20/
OS_ADD: +2.25
OS_OVR_VA: 20/
OS_OVR_VA: 20/
OD_AXIS: 060
OD_VPRISM_DIRECTION: BF
OD_AXIS: 060
OS_SPHERE: PL
OS_CYLINDER: SPH
OD_SPHERE: +2.00
OD_OVR_VA: 20/
OD_SPHERE: +2.00

## 2022-10-26 ASSESSMENT — AXIALLENGTH_DERIVED
OD_AL: 22.0935
OS_AL: 23.455
OS_AL: 23.455
OD_AL: 22.0935

## 2022-10-26 ASSESSMENT — KERATOMETRY
OS_K1POWER_DIOPTERS: 43.25
OS_AXISANGLE_DEGREES: 180
OD_K2POWER_DIOPTERS: 47.00
OD_AXISANGLE_DEGREES: 108
METHOD_AUTO_MANUAL: AUTO
OS_K2POWER_DIOPTERS: 44.50
OS_K1POWER_DIOPTERS: 43.25
METHOD_AUTO_MANUAL: AUTO
OS_AXISANGLE_DEGREES: 180
OD_K2POWER_DIOPTERS: 47.00
OD_K1POWER_DIOPTERS: 45.00
OS_K2POWER_DIOPTERS: 44.50
OD_K1POWER_DIOPTERS: 45.00
OD_AXISANGLE_DEGREES: 108

## 2022-10-26 ASSESSMENT — VISUAL ACUITY
OS_BCVA: 20/150
OS_BCVA: 20/150
OD_BCVA: 20/20-1
OD_BCVA: 20/20-1

## 2022-11-02 ENCOUNTER — OFFICE (OUTPATIENT)
Dept: URBAN - METROPOLITAN AREA CLINIC 6 | Facility: CLINIC | Age: 87
Setting detail: OPHTHALMOLOGY
End: 2022-11-02
Payer: MEDICARE

## 2022-11-02 DIAGNOSIS — Z96.1: ICD-10-CM

## 2022-11-02 PROCEDURE — 99024 POSTOP FOLLOW-UP VISIT: CPT | Performed by: OPHTHALMOLOGY

## 2022-11-02 ASSESSMENT — SPHEQUIV_DERIVED
OS_SPHEQUIV: 0.375
OD_SPHEQUIV: 1.75
OS_SPHEQUIV: 0

## 2022-11-02 ASSESSMENT — REFRACTION_CURRENTRX
OS_SPHERE: PL
OD_OVR_VA: 20/
OD_SPHERE: +2.00
OS_OVR_VA: 20/
OD_CYLINDER: -0.50
OD_ADD: +2.25
OS_VPRISM_DIRECTION: BF
OS_ADD: +2.25
OD_AXIS: 060
OS_CYLINDER: SPH
OD_VPRISM_DIRECTION: BF

## 2022-11-02 ASSESSMENT — REFRACTION_MANIFEST
OS_AXIS: 095
OD_AXIS: 060
OD_VA1: 20/400
OD_CYLINDER: -0.50
OS_SPHERE: +0.50
OD_SPHERE: +2.00
OS_VA1: 20/25-2
OS_CYLINDER: -1.00

## 2022-11-02 ASSESSMENT — CONFRONTATIONAL VISUAL FIELD TEST (CVF)
OS_FINDINGS: FULL
OD_FINDINGS: FULL

## 2022-11-02 ASSESSMENT — LID EXAM ASSESSMENTS
OD_BLEPHARITIS: RLL RUL
OS_BLEPHARITIS: LLL LUL

## 2022-11-02 ASSESSMENT — AXIALLENGTH_DERIVED
OD_AL: 31.43
OS_AL: 33.9719
OS_AL: 33.6716

## 2022-11-02 ASSESSMENT — TONOMETRY: OD_IOP_MMHG: 21

## 2022-11-02 ASSESSMENT — VISUAL ACUITY
OS_BCVA: 20/150
OD_BCVA: 20/25+2

## 2022-11-02 ASSESSMENT — REFRACTION_AUTOREFRACTION
OS_CYLINDER: -0.75
OS_SPHERE: +0.75
OS_AXIS: 79

## 2022-11-02 ASSESSMENT — KERATOMETRY
OS_AXISANGLE_DEGREES: 168
OS_K1POWER_DIOPTERS: 43.50
OD_AXISANGLE_DEGREES: 114
OS_K2POWER_DIOPTERS: 4.25
OD_K1POWER_DIOPTERS: 44.75
METHOD_AUTO_MANUAL: AUTO
OD_K2POWER_DIOPTERS: 6.50

## 2022-11-02 ASSESSMENT — PACHYMETRY
OD_CT_CORRECTION: 2
OS_CT_UM: 506
OS_CT_CORRECTION: 3
OD_CT_UM: 510

## 2022-12-06 ENCOUNTER — OFFICE (OUTPATIENT)
Dept: URBAN - METROPOLITAN AREA CLINIC 6 | Facility: CLINIC | Age: 87
Setting detail: OPHTHALMOLOGY
End: 2022-12-06
Payer: MEDICARE

## 2022-12-06 DIAGNOSIS — Z96.1: ICD-10-CM

## 2022-12-06 PROBLEM — H02.834 DERMATOCHALASIS; RIGHT UPPER LID, LEFT UPPER LID: Status: ACTIVE | Noted: 2022-12-06

## 2022-12-06 PROBLEM — H02.831 DERMATOCHALASIS; RIGHT UPPER LID, LEFT UPPER LID: Status: ACTIVE | Noted: 2022-12-06

## 2022-12-06 PROBLEM — H01.001 BLEPHARITIS; RIGHT UPPER LID, RIGHT LOWER LID, LEFT UPPER LID, LEFT LOWER LID: Status: ACTIVE | Noted: 2022-12-06

## 2022-12-06 PROBLEM — H40.053 OCULAR HYPERTENSION; BOTH EYES: Status: ACTIVE | Noted: 2022-08-09

## 2022-12-06 PROBLEM — H01.004 BLEPHARITIS; RIGHT UPPER LID, RIGHT LOWER LID, LEFT UPPER LID, LEFT LOWER LID: Status: ACTIVE | Noted: 2022-12-06

## 2022-12-06 PROBLEM — H01.002 BLEPHARITIS; RIGHT UPPER LID, RIGHT LOWER LID, LEFT UPPER LID, LEFT LOWER LID: Status: ACTIVE | Noted: 2022-12-06

## 2022-12-06 PROBLEM — H01.005 BLEPHARITIS; RIGHT UPPER LID, RIGHT LOWER LID, LEFT UPPER LID, LEFT LOWER LID: Status: ACTIVE | Noted: 2022-12-06

## 2022-12-06 PROCEDURE — 99024 POSTOP FOLLOW-UP VISIT: CPT | Performed by: OPHTHALMOLOGY

## 2022-12-06 ASSESSMENT — PACHYMETRY
OD_CT_UM: 510
OD_CT_CORRECTION: 2
OS_CT_CORRECTION: 3
OS_CT_UM: 506

## 2022-12-06 ASSESSMENT — SPHEQUIV_DERIVED
OS_SPHEQUIV: 0.375
OS_SPHEQUIV: 0.25
OD_SPHEQUIV: -0.75
OD_SPHEQUIV: -0.625

## 2022-12-06 ASSESSMENT — REFRACTION_CURRENTRX
OD_ADD: +2.25
OS_SPHERE: PL
OS_ADD: +2.25
OS_VPRISM_DIRECTION: BF
OD_CYLINDER: -0.50
OD_OVR_VA: 20/
OD_VPRISM_DIRECTION: BF
OD_SPHERE: +2.00
OS_OVR_VA: 20/
OS_CYLINDER: SPH
OD_AXIS: 060

## 2022-12-06 ASSESSMENT — KERATOMETRY
OD_AXISANGLE_DEGREES: 112
OD_K1POWER_DIOPTERS: 44.50
OS_K1POWER_DIOPTERS: 43.50
OD_K2POWER_DIOPTERS: 45.00
OS_K2POWER_DIOPTERS: 44.25
OS_AXISANGLE_DEGREES: 168
METHOD_AUTO_MANUAL: AUTO

## 2022-12-06 ASSESSMENT — REFRACTION_MANIFEST
OU_VA: 20/20
OS_VA1: 20/20-1
OD_ADD: +2.50
OD_CYLINDER: -0.75
OD_SPHERE: -0.25
OS_AXIS: 085
OS_SPHERE: +0.75
OS_CYLINDER: -0.75
OD_AXIS: 050
OD_VA1: 20/30-1
OS_ADD: +2.50

## 2022-12-06 ASSESSMENT — VISUAL ACUITY
OD_BCVA: 20/25-1
OS_BCVA: 20/70-

## 2022-12-06 ASSESSMENT — AXIALLENGTH_DERIVED
OD_AL: 23.3782
OD_AL: 23.4261
OS_AL: 23.3591
OS_AL: 23.3114

## 2022-12-06 ASSESSMENT — REFRACTION_AUTOREFRACTION
OD_SPHERE: -0.25
OD_AXIS: 050
OS_CYLINDER: -1.00
OS_SPHERE: +0.75
OS_AXIS: 083
OD_CYLINDER: -1.00

## 2022-12-06 ASSESSMENT — LID EXAM ASSESSMENTS
OD_BLEPHARITIS: RLL RUL
OS_BLEPHARITIS: LLL LUL

## 2022-12-06 ASSESSMENT — CONFRONTATIONAL VISUAL FIELD TEST (CVF)
OD_FINDINGS: FULL
OS_FINDINGS: FULL

## 2022-12-06 ASSESSMENT — TONOMETRY
OD_IOP_MMHG: 17
OS_IOP_MMHG: 20

## 2022-12-06 ASSESSMENT — LID POSITION - DERMATOCHALASIS
OS_DERMATOCHALASIS: LUL
OD_DERMATOCHALASIS: RUL

## 2023-05-05 ENCOUNTER — OFFICE (OUTPATIENT)
Dept: URBAN - METROPOLITAN AREA CLINIC 6 | Facility: CLINIC | Age: 88
Setting detail: OPHTHALMOLOGY
End: 2023-05-05
Payer: MEDICARE

## 2023-05-05 DIAGNOSIS — H02.831: ICD-10-CM

## 2023-05-05 DIAGNOSIS — H26.491: ICD-10-CM

## 2023-05-05 DIAGNOSIS — H01.002: ICD-10-CM

## 2023-05-05 DIAGNOSIS — H02.834: ICD-10-CM

## 2023-05-05 DIAGNOSIS — H01.004: ICD-10-CM

## 2023-05-05 DIAGNOSIS — H40.053: ICD-10-CM

## 2023-05-05 DIAGNOSIS — H01.005: ICD-10-CM

## 2023-05-05 DIAGNOSIS — Z96.1: ICD-10-CM

## 2023-05-05 DIAGNOSIS — H01.001: ICD-10-CM

## 2023-05-05 PROCEDURE — 92133 CPTRZD OPH DX IMG PST SGM ON: CPT | Performed by: OPHTHALMOLOGY

## 2023-05-05 PROCEDURE — 92014 COMPRE OPH EXAM EST PT 1/>: CPT | Performed by: OPHTHALMOLOGY

## 2023-05-05 ASSESSMENT — REFRACTION_MANIFEST
OS_CYLINDER: -0.75
OS_ADD: +2.50
OD_SPHERE: -0.25
OD_CYLINDER: -0.75
OU_VA: 20/20
OD_VA1: 20/30-1
OS_VA1: 20/20-1
OS_SPHERE: +0.75
OD_AXIS: 050
OD_ADD: +2.50
OS_AXIS: 085

## 2023-05-05 ASSESSMENT — REFRACTION_AUTOREFRACTION
OD_AXIS: 50
OS_CYLINDER: -1.50
OD_CYLINDER: -0.75
OD_SPHERE: -0.75
OS_SPHERE: +1.50
OS_AXIS: 90

## 2023-05-05 ASSESSMENT — REFRACTION_CURRENTRX
OD_SPHERE: +2.00
OS_ADD: +2.25
OS_OVR_VA: 20/
OD_CYLINDER: -0.50
OD_VPRISM_DIRECTION: BF
OD_AXIS: 060
OS_VPRISM_DIRECTION: BF
OS_CYLINDER: SPH
OD_OVR_VA: 20/
OS_SPHERE: PL
OD_ADD: +2.25

## 2023-05-05 ASSESSMENT — KERATOMETRY
OS_AXISANGLE_DEGREES: 176
OS_K1POWER_DIOPTERS: 43.00
OD_K2POWER_DIOPTERS: 45.00
OD_AXISANGLE_DEGREES: 133
OD_K1POWER_DIOPTERS: 44.50
OS_K2POWER_DIOPTERS: 44.00
METHOD_AUTO_MANUAL: AUTO

## 2023-05-05 ASSESSMENT — VISUAL ACUITY
OD_BCVA: 20/30-2
OS_BCVA: 20/80

## 2023-05-05 ASSESSMENT — TONOMETRY
OD_IOP_MMHG: 21
OS_IOP_MMHG: 19

## 2023-05-05 ASSESSMENT — AXIALLENGTH_DERIVED
OS_AL: 23.3033
OD_AL: 23.5711
OS_AL: 23.4467
OD_AL: 23.3782

## 2023-05-05 ASSESSMENT — PACHYMETRY
OS_CT_UM: 506
OD_CT_UM: 510
OS_CT_CORRECTION: 3
OD_CT_CORRECTION: 2

## 2023-05-05 ASSESSMENT — LID POSITION - DERMATOCHALASIS
OD_DERMATOCHALASIS: RUL
OS_DERMATOCHALASIS: LUL

## 2023-05-05 ASSESSMENT — SPHEQUIV_DERIVED
OS_SPHEQUIV: 0.75
OD_SPHEQUIV: -1.125
OS_SPHEQUIV: 0.375
OD_SPHEQUIV: -0.625

## 2023-05-05 ASSESSMENT — CONFRONTATIONAL VISUAL FIELD TEST (CVF)
OS_FINDINGS: FULL
OD_FINDINGS: FULL

## 2023-05-05 ASSESSMENT — LID EXAM ASSESSMENTS
OD_BLEPHARITIS: RLL RUL
OS_BLEPHARITIS: LLL LUL

## 2023-08-14 PROBLEM — M10.9 GOUT, UNSPECIFIED: Chronic | Status: ACTIVE | Noted: 2017-07-19

## 2023-08-14 PROBLEM — M81.0 AGE-RELATED OSTEOPOROSIS WITHOUT CURRENT PATHOLOGICAL FRACTURE: Chronic | Status: ACTIVE | Noted: 2017-07-19

## 2023-08-14 PROBLEM — M19.90 UNSPECIFIED OSTEOARTHRITIS, UNSPECIFIED SITE: Chronic | Status: ACTIVE | Noted: 2017-07-19

## 2023-08-24 ENCOUNTER — APPOINTMENT (OUTPATIENT)
Dept: ORTHOPEDIC SURGERY | Facility: CLINIC | Age: 88
End: 2023-08-24
Payer: MEDICARE

## 2023-08-24 ENCOUNTER — NON-APPOINTMENT (OUTPATIENT)
Age: 88
End: 2023-08-24

## 2023-08-24 VITALS
WEIGHT: 164 LBS | BODY MASS INDEX: 28 KG/M2 | SYSTOLIC BLOOD PRESSURE: 140 MMHG | DIASTOLIC BLOOD PRESSURE: 74 MMHG | HEART RATE: 62 BPM | HEIGHT: 64 IN

## 2023-08-24 PROCEDURE — 20610 DRAIN/INJ JOINT/BURSA W/O US: CPT | Mod: RT

## 2023-08-24 PROCEDURE — 99205 OFFICE O/P NEW HI 60 MIN: CPT | Mod: 25

## 2023-08-24 PROCEDURE — 73562 X-RAY EXAM OF KNEE 3: CPT | Mod: RT

## 2023-08-24 RX ADMIN — Medication %: at 00:00

## 2023-08-24 RX ADMIN — METHYLPREDNISOLONE ACETATE MG/ML: 40 INJECTION, SUSPENSION INTRA-ARTICULAR; INTRALESIONAL; INTRAMUSCULAR; SOFT TISSUE at 00:00

## 2023-08-24 NOTE — DISCUSSION/SUMMARY
[Medication Risks Reviewed] : Medication risks reviewed [Surgical risks reviewed] : Surgical risks reviewed [de-identified] : Patient was seen, evaluated and examined by Dr. Norton today, Dr. Norton is guiding this patient's orthopedic and surgical management.  Patient would be an excellent candidate for right total knee replacement surgery.  She has significant pain and discomfort with a significant malalignment.  Patient will need medical and cardiac clearance prior to surgery. Dr. Nazario evaluated this patient and is guiding this patients entire orthopedic management plan. The patient was advised that based upon their clinical presentation and radiographic findings they meet inclusion criteria for benefitting from a total knee arthroplasty as a treatment option for severe arthritis of the right knee. The spectrum of treatments for severe knee DJD were reviewed in detail. Dr. Nazario reviewed in detail the goals, alternatives, risks and benefits of total knee arthroplasty.  The patient was advised of the possible complications which are inclusive of but not exclusive to VTE-related, infectious-related, anesthetic-related, surgically-related, medically-related, and implant-related.  The patient was educated regarding the surgical procedure steps, especially using an Knee implant and bone model, as well as steps in their hospital course and primary discharge planning for home discharge with home care and home PT. Choices for implant 's, mainly DJO and Biomet-Raulito were reviewed, with selection to be made by surgeon intraoperatively. The patient was advised of the goals, alternatives, risks and benefits of autologous, directed and banked blood product transfusions for perioperative blood losses. I reviewed the plan of care as well as a model of the Knee implant equivalent to the one that will be used for the TKR. The patient agreed to the plan of care as well as the use of implants for their right TKR. The patient was advised that they will require a medical preoperative risk evaluation by their PCP. Further medical subspecialty clearances such as cardiac may be indicated if felt needed by their PCP. The patient was advised he/she may call our office after careful consideration of their treatment options and further consultation with any other physician to begin the process of preoperative planning for elective [right t] TKR at a time of their choosing.  Patient would like to pursue surgery sometime November 2023.  We gave her cortisone injection today to get her through the rest of the summer.  She was thoroughly educated on postinjection expectations.  Patient will book her surgery for the fall all of her questions were thoroughly answered to her satisfaction the daughter was present during the consultation

## 2023-08-24 NOTE — END OF VISIT
[FreeTextEntry3] : I Dr Nazario  personally performed the evaluation and management services for this new/established  patient. This includes conducting the initial examination, assessing all conditions, and establishing the plan of care. Today, my ACP Petros Yin was here to observe my evaluation and management services for this patient to be followed going forward.  Reyna Jennings M.D

## 2023-08-24 NOTE — PROCEDURE
[Injection] : Injection [Right] : of the right [Effusion] : Effusion [Osteoarthritis] : Osteoarthritis [Inflammation] : Inflammation [Diagnostic] : Diagnostic [Joint Pain] : Joint pain [Patient] : patient [Risk] : risk [Benefits] : benefits [Alternatives] : alternatives [Bleeding] : bleeding [Infection] : infection [Allergic Reaction] : allergic reaction [Verbal Consent Obtained] : verbal consent was obtained prior to the procedure [Ethyl Chloride Spray] : ethyl chloride spray was used as a topical anesthetic [Lateral] : lateral [22] : a 22-gauge [1% Lidocaine___(mL)] : [unfilled] mL of 1% Lidocaine [Methylpred. 40mg/mL___(mL)] : [unfilled] mL of 40mg/mL methylprednisolone [Bandage Applied] : a bandage [Tolerated Well] : The patient tolerated the procedure well [None] : none [PRN] : as needed [FreeTextEntry1] : Chlorhexidine

## 2023-08-24 NOTE — HISTORY OF PRESENT ILLNESS
[Worsening] : worsening [10] : a maximum pain level of 10/10 [Standing] : standing [Daily] : ~He/She~ states the symptoms seem to be occuring daily [Bending] : worsened by bending [Lifting] : worsened by lifting [Sitting] : worsened by sitting [Running] : worsened by running [Walking] : worsened by walking [Knee Flexion] : worsened with knee flexion [Knee Extension] : worsened with knee extension [Acetaminophen] : relieved by acetaminophen [Exercise Regimen] : relieved by exercise regimen [Heat] : relieved by heat [Ice] : relieved by ice [NSAIDs] : relieved by nonsteroidal anti-inflammatory drugs [Physical Therapy] : relieved by physical therapy [Rest] : relieved by rest [de-identified] : Right knee pain, S/P left TKR 8/10/2017 The patient is a 88-year-old female with no significant past medical history presents with severe and debilitating right knee pain affecting her activities of daily living for 6 years getting progressively worse over the past several months.  Patient has become very deconditioned because of the right knee.  She has difficulty standing for long periods of time, walking for long periods of time, negotiating stairs.  The patient has an orthopedic history of having a left total knee replacement in 2017 with us with excellent results.  Presently she is ambulating with a rolling walker.  She does live with her daughter and her .  They feel that she been extremely deconditioned because of this right knee.  She feels she has a significant malalignment.  She is here for an orthopedic evaluation right knee [Ataxia] : no ataxia [Incontinence] : no incontinence [Loss of Dexterity] : good dexterity [Urinary Ret.] : no urinary retention

## 2023-08-24 NOTE — PHYSICAL EXAM
[Antalgic] : antalgic [UE/LE] : Sensory: Intact in bilateral upper & lower extremities [ALL] : dorsalis pedis, posterior tibial, femoral, popliteal, and radial 2+ and symmetric bilaterally [Normal RUE] : Right Upper Extremity: No scars, rashes, lesions, ulcers, skin intact [Normal LUE] : Left Upper Extremity: No scars, rashes, lesions, ulcers, skin intact [Normal RLE] : Right Lower Extremity: No scars, rashes, lesions, ulcers, skin intact [Normal LLE] : Left Lower Extremity: No scars, rashes, lesions, ulcers, skin intact [Normal] : no peripheral adenopathy appreciated [de-identified] : Patient ambulates with a severe antalgic gait to the right she uses a rolling walker.  She has significant difficulty getting up and down the examination table.  Leg lengths appear equal on the table.  Patient does have full range of motion of both the right and left hip without pain, discomfort, or instability.  Patient has no pain to palpation over the greater trochanteric regions or the iliotibial bands bilaterally. Right knee: Skin clean, dry, intact.  Moderate effusion.  7 degree laxity with varus valgus stresses.  Negative anteroposterior drawer.  No extension lag.  10 degree flexion contracture.  Range of motion - with pain on further flexion crepitus all 3 compartments.  Calves are soft, nontender, no evidence of DVT at this time.  Patient is good motor and sensory to both legs. Right knee well healed midline incision without erythema, drainage, or evidence of cellulitis.  Less than 5 degree laxity with varus valgus stresses.  Negative anteroposterior drawer.  No extension lag.  No flexion contracture.  Range of motion 0-1 30. [de-identified] : Severe end-stage degenerative joint disease to the right knee with multiple subchondral cyst, periarticular osteophytes, bone-on-bone contact all 3 compartments, more significant lateral femoral-tibial compartment the patellofemoral joint with significant valgus malalignment.  Significant osteopenia noted.

## 2023-08-24 NOTE — REASON FOR VISIT
[Initial Visit] : an initial visit for [Family Member] : family member [FreeTextEntry2] : Right knee pain, S/P left TKR 8/10/2017

## 2023-08-26 RX ORDER — LIDOCAINE HYDROCHLORIDE 10 MG/ML
1 INJECTION, SOLUTION INFILTRATION; PERINEURAL
Refills: 0 | Status: COMPLETED | OUTPATIENT
Start: 2023-08-24

## 2023-08-26 RX ORDER — METHYLPRED ACET/NACL,ISO-OS/PF 40 MG/ML
40 VIAL (ML) INJECTION
Qty: 1 | Refills: 0 | Status: COMPLETED | OUTPATIENT
Start: 2023-08-24

## 2023-11-30 ENCOUNTER — APPOINTMENT (OUTPATIENT)
Dept: ORTHOPEDIC SURGERY | Facility: HOSPITAL | Age: 88
End: 2023-11-30

## 2023-12-17 ENCOUNTER — OFFICE (OUTPATIENT)
Dept: URBAN - METROPOLITAN AREA CLINIC 6 | Facility: CLINIC | Age: 88
Setting detail: OPHTHALMOLOGY
End: 2023-12-17
Payer: MEDICARE

## 2023-12-17 DIAGNOSIS — H01.005: ICD-10-CM

## 2023-12-17 DIAGNOSIS — H01.002: ICD-10-CM

## 2023-12-17 DIAGNOSIS — H02.831: ICD-10-CM

## 2023-12-17 DIAGNOSIS — Z96.1: ICD-10-CM

## 2023-12-17 DIAGNOSIS — H01.001: ICD-10-CM

## 2023-12-17 DIAGNOSIS — H40.053: ICD-10-CM

## 2023-12-17 DIAGNOSIS — H16.223: ICD-10-CM

## 2023-12-17 DIAGNOSIS — H26.491: ICD-10-CM

## 2023-12-17 DIAGNOSIS — H01.004: ICD-10-CM

## 2023-12-17 DIAGNOSIS — H02.834: ICD-10-CM

## 2023-12-17 PROCEDURE — 92250 FUNDUS PHOTOGRAPHY W/I&R: CPT | Performed by: OPHTHALMOLOGY

## 2023-12-17 PROCEDURE — 99213 OFFICE O/P EST LOW 20 MIN: CPT | Performed by: OPHTHALMOLOGY

## 2023-12-17 PROCEDURE — 92083 EXTENDED VISUAL FIELD XM: CPT | Performed by: OPHTHALMOLOGY

## 2023-12-17 ASSESSMENT — REFRACTION_AUTOREFRACTION
OS_CYLINDER: 0-1.25
OS_AXIS: 096
OD_AXIS: 043
OS_SPHERE: +1.00
OD_SPHERE: -0.75
OD_CYLINDER: -0.75

## 2023-12-17 ASSESSMENT — REFRACTION_CURRENTRX
OS_ADD: +2.25
OD_AXIS: 060
OD_SPHERE: +2.00
OD_VPRISM_DIRECTION: BF
OD_CYLINDER: -0.50
OD_ADD: +2.25
OD_OVR_VA: 20/
OS_SPHERE: PL
OS_CYLINDER: SPH
OS_OVR_VA: 20/
OS_VPRISM_DIRECTION: BF

## 2023-12-17 ASSESSMENT — CONFRONTATIONAL VISUAL FIELD TEST (CVF)
OD_FINDINGS: FULL
OS_FINDINGS: FULL

## 2023-12-17 ASSESSMENT — REFRACTION_MANIFEST
OD_VA1: 20/30-1
OD_SPHERE: -0.25
OS_AXIS: 085
OD_AXIS: 050
OS_ADD: +2.50
OD_CYLINDER: -0.75
OS_SPHERE: +0.75
OS_CYLINDER: -0.75
OS_VA1: 20/20-1
OU_VA: 20/20
OD_ADD: +2.50

## 2023-12-17 ASSESSMENT — LID POSITION - DERMATOCHALASIS
OD_DERMATOCHALASIS: RUL
OS_DERMATOCHALASIS: LUL

## 2023-12-17 ASSESSMENT — LID EXAM ASSESSMENTS
OS_BLEPHARITIS: LLL LUL
OD_BLEPHARITIS: RLL RUL

## 2023-12-17 ASSESSMENT — SPHEQUIV_DERIVED
OS_SPHEQUIV: 0.375
OD_SPHEQUIV: -0.625
OD_SPHEQUIV: -1.125

## 2024-04-05 ENCOUNTER — APPOINTMENT (OUTPATIENT)
Dept: ORTHOPEDIC SURGERY | Facility: CLINIC | Age: 89
End: 2024-04-05

## 2024-04-26 ENCOUNTER — APPOINTMENT (OUTPATIENT)
Dept: ORTHOPEDIC SURGERY | Facility: CLINIC | Age: 89
End: 2024-04-26
Payer: MEDICARE

## 2024-04-26 DIAGNOSIS — Z96.652 PRESENCE OF LEFT ARTIFICIAL KNEE JOINT: ICD-10-CM

## 2024-04-26 PROCEDURE — 73562 X-RAY EXAM OF KNEE 3: CPT | Mod: 50

## 2024-04-26 PROCEDURE — G2211 COMPLEX E/M VISIT ADD ON: CPT

## 2024-04-26 PROCEDURE — 99213 OFFICE O/P EST LOW 20 MIN: CPT

## 2024-04-26 RX ORDER — CELECOXIB 200 MG/1
200 CAPSULE ORAL TWICE DAILY
Qty: 30 | Refills: 1 | Status: ACTIVE | COMMUNITY
Start: 2024-04-26 | End: 1900-01-01

## 2024-04-26 NOTE — HISTORY OF PRESENT ILLNESS
[de-identified] : Patient presents today with daughter for evaluation of lower leg pain as well as knee pain.  The patient is status post left knee arthroplasty August 2017.  She reports of doing well for a number of years.  More recently she reports of increasing right knee pain.  The patient was last seen in this office in August 2023.  She had a corticosteroid injection of the right knee.  She states she had no improvement in the knee pain.  She reports of loss of motion of the right knee.  She reports of difficulties ambulating because of the right knee.  Upon further questioning, the patient reports of pain of both lower legs when she ambulates.  This improves when she sits.  She cannot ambulate a long distance.  She continuously uses a rolling walker to assist with balance as well as the leg pain.  She does not have any specific lower back pain.  She does report a vague pains of the upper extremities as well.  She is not known to have any rheumatoid like illness.  She has been taking oxycodone intermittently for pain which is not providing any sustained relief.  She reports past acetaminophen does not relieve any pain either.  Review of Systems- Constitutional: No fever or chills.  Cardiovascular: No orthopnea or chest pain Pulmonary: No shortness of breath.  GI: No nausea or vomiting or abdominal pain. Musculoskeletal: see HPI  Psychiatric: No anxiety and depression.

## 2024-04-26 NOTE — DISCUSSION/SUMMARY
[de-identified] : 25 minutes was spent reviewing the x-rays as well as discussing with the patient their clinical presentation, diagnosis and providing education.  Today's x-rays reviewed with the patient.  The patient reports after the previous corticosteroid injection of the right knee that her knee pain worsened.  She has been taking oxycodone for pain control because acetaminophen is not relieving her pain.  She is not routinely using any anti-inflammatories.  The patient does describe of increasing pain when she ambulates as well as improved pain when she sits.  She does report that she is limited in her ambulation.  She does not have any distal motor or sensory changes.  There is concern for possible spinal stenosis being a factor here on top of the end-stage osteoarthritis of the right knee.  She would benefit from a knee replacement however, given the symptoms, she is recommended a spine evaluation to assess for spinal stenosis.  Contact information is provided to the patient to make an appointment.  She will have her spine evaluation and then return back to the office after completion of the workup to discuss the findings and possibly finalize plans for knee arthroplasty.  The daughter understands this treatment plan.  The patient is recommended Celebrex.  A prescription is sent.  All questions answered to the patient's satisfaction.

## 2024-04-26 NOTE — PHYSICAL EXAM
[de-identified] : The patient is conversive and in no apparent distress. The patient is alert and oriented to person, place, and time. Affect and mood appear normal. The head is normocephalic and atraumatic. Skin shows normal turgor with no evidence of eczema or psoriasis. No respiratory distress noted. Sensation grossly intact. MUSCULOSKELETAL:   SEE BELOW  Left knee exam demonstrates well-healed surgical incision.  No signs of acute trauma.  No effusions.  Neutral alignment.  No extensor mechanism lag.  No flexion contracture.  No laxity.  Range of motion is 0 degrees of extension to 115 degrees of flexion.  No crepitus.  Mild varicose veins.  No open wounds distally.  Right knee exam demonstrates skin is clean, dry intact.  Mild soft tissue swelling.  Small effusion.  Minimal warmth.  -5 degree valgus alignment which is correctable back to neutral.  There is a -7 degree flexion contracture.  Flexion is limited to approximately 95 degrees secondary to stiffness and discomfort.  Patient's ambulation is slow with the assistance of a walker.  The patient is comfortable in the exam table without any lower leg pain. [de-identified] : X-ray of the left knee was reviewed. Implants are in good position. No signs of loosening or fracture.  Three-view right knee x-rays were reviewed.  Severe global degenerative changes with bone-on-bone DJD of the lateral compartment.  Large osteophyte formation is noted in all compartments.

## 2024-04-30 ENCOUNTER — APPOINTMENT (OUTPATIENT)
Dept: ORTHOPEDIC SURGERY | Facility: CLINIC | Age: 89
End: 2024-04-30
Payer: MEDICARE

## 2024-04-30 VITALS — BODY MASS INDEX: 28 KG/M2 | WEIGHT: 164 LBS | HEIGHT: 64 IN

## 2024-04-30 DIAGNOSIS — M43.16 SPONDYLOLISTHESIS, LUMBAR REGION: ICD-10-CM

## 2024-04-30 DIAGNOSIS — M43.17 SPONDYLOLISTHESIS, LUMBOSACRAL REGION: ICD-10-CM

## 2024-04-30 DIAGNOSIS — M47.816 SPONDYLOSIS W/OUT MYELOPATHY OR RADICULOPATHY, LUMBAR REGION: ICD-10-CM

## 2024-04-30 PROCEDURE — 72110 X-RAY EXAM L-2 SPINE 4/>VWS: CPT

## 2024-04-30 PROCEDURE — 99214 OFFICE O/P EST MOD 30 MIN: CPT

## 2024-04-30 PROCEDURE — 99204 OFFICE O/P NEW MOD 45 MIN: CPT

## 2024-05-01 NOTE — PHYSICAL EXAM
[de-identified] : CONSTITUTIONAL: Patient is a very pleasant individual who is well-nourished and appears stated age. PSYCHIATRIC: Alert and oriented times three and in no apparent distress, and participates with orthopedic evaluation well. HEAD: Atraumatic and nonsyndromic in appearance. EENT: No thyromegaly, EOMI. RESPIRATORY: Respiratory rate is regular, not dyspneic on examination. LYMPHATICS: There is no cervical or axillary lymphadenopathy. INTEGUMENTARY: Skin is clean, dry, and intact to bilateral lower extremities. VASCULAR: There is brisk capillary refill about the bilateral Lower Extremities with 2+ DP Pulse  Palpation: No significant palpation to the lower lumbar spine difficult to discern given patient body habitus There is pain with range of motion of right knee especially with deep flexion  Muscle Strength Testing: Hip Flexion: 4/5 B/L Knee Extension: 5/5 B/L Knee Flexion: 5/5 B/L Dorsiflexion: 5/5 B/L EHL: 5/5 B/L Plantarflexion: 5/5 B/L  Sensation: SILT L2-S1 B/L except: None  Gait: Patient is able to stand obvious valgus deformity right knee standing for short periods will cause immediate discomfort mainly in knee area and popliteal fossa specially in right leg   Special Testing:  Negative SLR BLLE Negative clonus BLLE  [de-identified] : Standing AP, lateral, flexion and extension radiographs of the lumbar spine performed on 4/30/2024 in the Radiology Department at [facility] [place] for the indication of low back pain are reviewed.  These studies demonstrate there is severe bilateral SI joint arthropathy there is severe bilateral facet arthropathy L4-S1 Grade 1 anterolisthesis of L4 and L5 grade 2 spondylolisthesis of L5 on S1

## 2024-05-01 NOTE — ASSESSMENT
[FreeTextEntry1] : 89-year-old female with L4-5 and L5-S1 spondylolisthesis and lumbar spondylosis  Today Renata, her daughter and I reviewed her physical exam findings her signs and symptoms as well as her x-rays today in the office, she has grade 2 spondylolisthesis of L5 on S1 and grade 1 spondylolisthesis of L4 and L5 as well as significant lumbar spondylosis, however at this time her symptoms do not appear to be claudicatory in nature and I believe the majority of her lower extremity symptoms are coming from her severe right knee osteoarthritis.  She does have low back pain and I recommended that we can try some anti-inflammatories as well as a course of physical therapy given the patient's age body habitus I do not believe she would be a surgical candidate as any surgical invention would likely require a fusion given her spondylolisthesis.

## 2024-05-01 NOTE — HISTORY OF PRESENT ILLNESS
[de-identified] : Chief Complaint: Bilateral knee pain   History of Present Illness: 89-year-old female presenting today for evaluation of bilateral knee pain.  She has been frequently seen by Dr. Nazario and YESSENIA Garcia for her bilateral knee pain she has a left total knee replacement done several years ago she also has severe right knee osteoarthritis valgus deformity.  She states that when she walks she has severe knee pain mainly in the posterior aspect of her popliteal fossa that can radiate to the anterior tibia, she also has back pain she states the back pain is mainly isolated to the lower lumbar spine lumbosacral region aching in nature that is also worse with walking.  She denies any radiation of her pain from her low back to her foot she denies any numbness tingling in her lower extremities.  Overall states that the pain is isolated to the knee popliteal fossa right greater than left worse with ambulation forced her to sit down.  She uses a walker at baseline.  She has tried some over-the-counter anti-inflammatories with limited relief of her symptoms.  She was sent to me to evaluate for possible lumbar spine as a cause for her lower extremity pain.   Past medical history, past surgical history, medications, allergies, social history, and family history are as documented in our records today.  Notable items include: None   Review of Systems: I have reviewed the patient's documented Review of Systems data today, I concur with this documentation.

## 2024-05-01 NOTE — DISCUSSION/SUMMARY
[de-identified] : She will continue her Celebrex 200 mg twice daily for pain control I recommended home physical [facility] service referral was placed to see if patient qualifies for at home rehab Follow-up with Dr. Nazario regarding her bilateral knee pain I do not believe that her lower extremity symptoms are radicular or neurogenic claudication in nature given her significant valgus deformity and severe right knee osteoarthritis I will see her back if she has any issues or if her back pain comes more prominent future I discussed with him that we can possibly do more advanced imaging and injections

## 2024-05-02 DIAGNOSIS — M79.642 PAIN IN RIGHT HAND: ICD-10-CM

## 2024-05-02 DIAGNOSIS — M79.641 PAIN IN RIGHT HAND: ICD-10-CM

## 2024-05-03 ENCOUNTER — APPOINTMENT (OUTPATIENT)
Dept: ORTHOPEDIC SURGERY | Facility: CLINIC | Age: 89
End: 2024-05-03
Payer: MEDICARE

## 2024-05-03 DIAGNOSIS — M75.90 BURSITIS OF UNSPECIFIED SHOULDER: ICD-10-CM

## 2024-05-03 DIAGNOSIS — M25.531 PAIN IN RIGHT WRIST: ICD-10-CM

## 2024-05-03 DIAGNOSIS — R29.898 OTHER SYMPTOMS AND SIGNS INVOLVING THE MUSCULOSKELETAL SYSTEM: ICD-10-CM

## 2024-05-03 DIAGNOSIS — M25.511 PAIN IN RIGHT SHOULDER: ICD-10-CM

## 2024-05-03 DIAGNOSIS — M75.20 BICIPITAL TENDINITIS, UNSPECIFIED SHOULDER: ICD-10-CM

## 2024-05-03 DIAGNOSIS — G89.29 PAIN IN RIGHT SHOULDER: ICD-10-CM

## 2024-05-03 DIAGNOSIS — M25.521 PAIN IN RIGHT ELBOW: ICD-10-CM

## 2024-05-03 DIAGNOSIS — M25.522 PAIN IN LEFT ELBOW: ICD-10-CM

## 2024-05-03 DIAGNOSIS — M25.532 PAIN IN RIGHT WRIST: ICD-10-CM

## 2024-05-03 DIAGNOSIS — M25.512 PAIN IN RIGHT SHOULDER: ICD-10-CM

## 2024-05-03 DIAGNOSIS — M75.50 BURSITIS OF UNSPECIFIED SHOULDER: ICD-10-CM

## 2024-05-03 PROCEDURE — 73130 X-RAY EXAM OF HAND: CPT | Mod: 50

## 2024-05-03 PROCEDURE — 20610 DRAIN/INJ JOINT/BURSA W/O US: CPT | Mod: 50

## 2024-05-03 PROCEDURE — 99215 OFFICE O/P EST HI 40 MIN: CPT | Mod: 25

## 2024-05-03 NOTE — HISTORY OF PRESENT ILLNESS
[FreeTextEntry1] : Renata is a very pleasant 89-year-old female who is here with her family member who helped with history.  She has been struggling with bilateral shoulder, bilateral elbow and bilateral wrist and hand discomfort.  Difficulty with ADLs.  Difficulty with shoulder motion.  Denies injury

## 2024-05-03 NOTE — ASSESSMENT
[FreeTextEntry1] : ASSESSMENT: The patient comes in today with multiple chronic worsening exacerbated complaints including shoulder tendinopathy impingement bursitis weakness as well as distal biceps tendinosis bilaterally and findings of basal joint arthropathy and tendinosis of the wrist.  At this point we have discussed treatment modalities for these conditions.  I do recommend physical therapy she elects for injections She was assisted by her family member/caretaker   The patient was adequately and thoroughly informed of my assessment of their current condition(s).  - This may diminish bodily function for the extremity. We discussed prognosis, tx modalities including operative and nonoperative options for the above diagnostic assessment. As always, 2nd opinion is always provided as an option.  When accessible, I was able to review other physicians note(s) including reviewing other tests, imaging results as well as personally view these results for my own interpretation.   Bilateral SUBACROMIAL SHOULDER INJECTION   Indication:  subacromial bursitis/impingement, pain   Risk, benefits and alternatives were discussed with the patient. Potential complications include bleeding and infection. Alcohol was used to prep the area.  Ethyl chloride spray was used as a topical anesthetic.  Using sterile technique, the injection needle was then directed from a standard posterior approach parallel to and inferior to the acromion. A 21g needle was used to inject 5 mL of 1% Lidocaine and 1 unit 10mg Kenalog.  No significant resistance was encountered.  A bandage was applied.  The patient tolerated the procedure well.    Patient instructed to avoid strenuous activity for 2 day(s). Specifically counseled regarding the signs and symptoms of potential infection and instructed to present promptly to clinic or hospital if such signs and symptoms arise.  The patient was adequately and thoroughly informed of my assessment of their current condition(s).  DISCUSSION: 1.  Injection bilateral shoulder as above.  Physical therapy prescribed including biceps tendinosis. 2.  Activity modification for basal joint. 3. [x]

## 2024-05-03 NOTE — PHYSICAL EXAM
[de-identified] : Examination of the bilateral shoulder reveals equal active and passive motion as compared to the contralateral side There is a positive Speed, positive Dayron, positive Lan, tenderness with anterior shoulder compression. 3+/5 strength Examination of the bilateral elbow reveals pain with hook testing.  There is significant pain and weakness with resisted supination.  This causes significant discomfort for the patient. Examination at the level of the [bilateral] wrist reveals tenderness at the level of the first dorsal compartment with a positive finkelstein. Examination of the [bilateral] thumb basal joint reveals pain with compression of the CMC joint with a positive grind test for pain [de-identified] : [4] views of [bilateral hands and wrists] were obtained today in my office and were seen by me and discussed with the patient.  These [show findings consistent with bilateral basal joint OA and findings of IP joint OA]

## 2024-06-27 ENCOUNTER — APPOINTMENT (OUTPATIENT)
Dept: ORTHOPEDIC SURGERY | Facility: CLINIC | Age: 89
End: 2024-06-27
Payer: MEDICARE

## 2024-06-27 VITALS — DIASTOLIC BLOOD PRESSURE: 69 MMHG | HEART RATE: 68 BPM | SYSTOLIC BLOOD PRESSURE: 164 MMHG

## 2024-06-27 DIAGNOSIS — M17.11 UNILATERAL PRIMARY OSTEOARTHRITIS, RIGHT KNEE: ICD-10-CM

## 2024-06-27 PROCEDURE — 99214 OFFICE O/P EST MOD 30 MIN: CPT

## 2024-06-27 PROCEDURE — 73562 X-RAY EXAM OF KNEE 3: CPT | Mod: 50

## 2024-08-23 ENCOUNTER — APPOINTMENT (OUTPATIENT)
Dept: ORTHOPEDIC SURGERY | Facility: CLINIC | Age: 89
End: 2024-08-23
Payer: MEDICARE

## 2024-08-23 DIAGNOSIS — G89.29 PAIN IN RIGHT SHOULDER: ICD-10-CM

## 2024-08-23 DIAGNOSIS — M19.019 PRIMARY OSTEOARTHRITIS, UNSPECIFIED SHOULDER: ICD-10-CM

## 2024-08-23 DIAGNOSIS — M75.90 BURSITIS OF UNSPECIFIED SHOULDER: ICD-10-CM

## 2024-08-23 DIAGNOSIS — R29.898 OTHER SYMPTOMS AND SIGNS INVOLVING THE MUSCULOSKELETAL SYSTEM: ICD-10-CM

## 2024-08-23 DIAGNOSIS — M79.641 PAIN IN RIGHT HAND: ICD-10-CM

## 2024-08-23 DIAGNOSIS — M75.50 BURSITIS OF UNSPECIFIED SHOULDER: ICD-10-CM

## 2024-08-23 DIAGNOSIS — M19.049 PRIMARY OSTEOARTHRITIS, UNSPECIFIED HAND: ICD-10-CM

## 2024-08-23 DIAGNOSIS — M79.642 PAIN IN RIGHT HAND: ICD-10-CM

## 2024-08-23 DIAGNOSIS — M65.4 RADIAL STYLOID TENOSYNOVITIS [DE QUERVAIN]: ICD-10-CM

## 2024-08-23 DIAGNOSIS — M25.512 PAIN IN RIGHT SHOULDER: ICD-10-CM

## 2024-08-23 DIAGNOSIS — M25.511 PAIN IN RIGHT SHOULDER: ICD-10-CM

## 2024-08-23 PROCEDURE — 20600 DRAIN/INJ JOINT/BURSA W/O US: CPT | Mod: 59,LT

## 2024-08-23 PROCEDURE — 20610 DRAIN/INJ JOINT/BURSA W/O US: CPT | Mod: 50

## 2024-08-23 PROCEDURE — 99214 OFFICE O/P EST MOD 30 MIN: CPT | Mod: 25

## 2024-08-23 PROCEDURE — 73030 X-RAY EXAM OF SHOULDER: CPT | Mod: 50

## 2024-08-23 PROCEDURE — 20550 NJX 1 TENDON SHEATH/LIGAMENT: CPT | Mod: 59,LT

## 2024-08-23 NOTE — ASSESSMENT
[FreeTextEntry1] : ASSESSMENT: The patient comes in today with chronic exacerbated bilateral shoulder pain and difficulty with overhead activities.  In addition she also reports chronic exacerbated left wrist and thumb discomfort.  She describes difficulty with activities involve pinch and .  Symptoms are bothersome and are affecting her ADLs.  She describes some relief with injections this visit, however symptoms have been persistent.  Symptoms are consistent with bilateral shoulder glenohumeral arthritis, tendinopathy, bursitis, impingement, weakness as well as left basal joint arthropathy and left de Quervain's tenosynovitis.  Treatment modalities were discussed, the patient and family ember elect to proceed with injections.  We have also discussed the importance of continuing with physical therapy for bilateral shoulder symptoms as well as HEP.  She will continue with activity modifications as well.   The patient was adequately and thoroughly informed of my assessment of their current condition(s).  - This may diminish bodily function for the extremity.  We discussed prognosis, treatment modalities including operative and nonoperative options for the above diagnostic assessment. As always, 2nd opinion is always provided as an option. For this, when accessible, I was able to review other physicians note(s) including reviewing other tests, imaging results as well as personally view these results for my own interpretation.      [Bilateral] SUBACROMIAL SHOULDER INJECTION     Indication:  subacromial bursitis/impingement, pain     Risk, benefits and alternatives were discussed with the patient. Potential complications include bleeding and infection. Alcohol was used to prep the area. Ethyl chloride spray was used as a topical anesthetic.  Using sterile technique, the injection needle was then directed from a standard posterior approach parallel to and inferior to the acromion.  A 21g needle was used to inject 5 mL of 1% Lidocaine and 2 mL Kenalog (10mg). No significant resistance was encountered. A bandage was applied. The patient tolerated the procedure well. Patient instructed to avoid strenuous activity for 2 day(s). Specifically counseled regarding the signs and symptoms of potential infection and instructed to present promptly to clinic or hospital if such signs and symptoms arise.  Injection:   The risks and benefits of a steroid injection were discussed in detail. The risks include but are not limited to: pain, infection, swelling, flare response, bleeding, subcutaneous fat atrophy, skin depigmentation and/or elevation of blood sugar. The risk of incomplete resolution of symptoms, recurrence and additional intervention was reviewed and considered by the patient.  The patient agreed to proceed and under a sterile prep, I injected 1 unit (6mg) into 1 cc of a combination of Celestone and Lidocaine into the [left basal joint, left de Quervain's]. The patient tolerated the injection well.   DISCUSSION: 1.  Bilateral shoulder injections as above.  Injections as above.  Activity modifications.  Continue PT and HEP. 2.  Follow-up in 3 months. 3. [x]

## 2024-08-23 NOTE — PHYSICAL EXAM
[de-identified] : Examination of the bilateral shoulder reveals equal active and passive motion as compared to the contralateral side There is a positive Speed, positive Dayron, positive Lan, tenderness with anterior shoulder compression. 3+/5 strength Examination of the bilateral elbow reveals pain with hook testing. There is significant pain and weakness with resisted supination. This causes significant discomfort for the patient. Examination at the level of the [bilateral] wrist reveals tenderness at the level of the first dorsal compartment with a positive finkelstein. Examination of the [bilateral] thumb basal joint reveals pain with compression of the CMC joint with a positive grind test for pain [de-identified] : [4] views of [right] shoulder were obtained today in my office and were seen by me and discussed with the patient. These [show findings consistent with AC arthropathy and moderate glenohumeral OA]  [4] views of [left] shoulder were obtained today in my office and were seen by me and discussed with the patient. These [show findings consistent with AC arthropathy and moderate glenohumeral OA]  [4] views of [bilateral hands and wrists] were reviewed today in my office and were seen by me and discussed with the patient. These [show findings consistent with bilateral basal joint OA and findings of IP joint OA].

## 2024-08-23 NOTE — HISTORY OF PRESENT ILLNESS
[FreeTextEntry1] : Renata is a very pleasant 89-year-old female who presents today with a family member who helped with history.  She presents today with chronic exacerbated bilateral shoulder pain and difficulty with overhead activities.  In addition she also reports chronic exacerbated left wrist and thumb discomfort.  She describes difficulty with activities involve pinch and .  Symptoms are bothersome and are affecting her ADLs.  She describes some relief with injections this visit, however symptoms have been persistent.

## 2024-08-24 ENCOUNTER — OUTPATIENT (OUTPATIENT)
Dept: OUTPATIENT SERVICES | Facility: HOSPITAL | Age: 89
LOS: 1 days | End: 2024-08-24
Payer: MEDICARE

## 2024-08-24 VITALS
WEIGHT: 182.98 LBS | RESPIRATION RATE: 16 BRPM | DIASTOLIC BLOOD PRESSURE: 72 MMHG | HEART RATE: 98 BPM | OXYGEN SATURATION: 97 % | SYSTOLIC BLOOD PRESSURE: 160 MMHG | TEMPERATURE: 97 F | HEIGHT: 63 IN

## 2024-08-24 DIAGNOSIS — Z91.89 OTHER SPECIFIED PERSONAL RISK FACTORS, NOT ELSEWHERE CLASSIFIED: ICD-10-CM

## 2024-08-24 DIAGNOSIS — Z96.652 PRESENCE OF LEFT ARTIFICIAL KNEE JOINT: Chronic | ICD-10-CM

## 2024-08-24 DIAGNOSIS — I65.29 OCCLUSION AND STENOSIS OF UNSPECIFIED CAROTID ARTERY: ICD-10-CM

## 2024-08-24 DIAGNOSIS — Z29.9 ENCOUNTER FOR PROPHYLACTIC MEASURES, UNSPECIFIED: ICD-10-CM

## 2024-08-24 DIAGNOSIS — Z01.818 ENCOUNTER FOR OTHER PREPROCEDURAL EXAMINATION: ICD-10-CM

## 2024-08-24 DIAGNOSIS — M17.11 UNILATERAL PRIMARY OSTEOARTHRITIS, RIGHT KNEE: ICD-10-CM

## 2024-08-24 DIAGNOSIS — I10 ESSENTIAL (PRIMARY) HYPERTENSION: ICD-10-CM

## 2024-08-24 LAB
A1C WITH ESTIMATED AVERAGE GLUCOSE RESULT: 5.1 % — SIGNIFICANT CHANGE UP (ref 4–5.6)
ANION GAP SERPL CALC-SCNC: 17 MMOL/L — SIGNIFICANT CHANGE UP (ref 5–17)
APTT BLD: 32.4 SEC — SIGNIFICANT CHANGE UP (ref 24.5–35.6)
BASOPHILS # BLD AUTO: 0 K/UL — SIGNIFICANT CHANGE UP (ref 0–0.2)
BASOPHILS NFR BLD AUTO: 0 % — SIGNIFICANT CHANGE UP (ref 0–2)
BLD GP AB SCN SERPL QL: SIGNIFICANT CHANGE UP
BUN SERPL-MCNC: 27.4 MG/DL — HIGH (ref 8–20)
CALCIUM SERPL-MCNC: 9.4 MG/DL — SIGNIFICANT CHANGE UP (ref 8.4–10.5)
CHLORIDE SERPL-SCNC: 103 MMOL/L — SIGNIFICANT CHANGE UP (ref 96–108)
CO2 SERPL-SCNC: 20 MMOL/L — LOW (ref 22–29)
CREAT SERPL-MCNC: 1.11 MG/DL — SIGNIFICANT CHANGE UP (ref 0.5–1.3)
EGFR: 48 ML/MIN/1.73M2 — LOW
EOSINOPHIL # BLD AUTO: 0 K/UL — SIGNIFICANT CHANGE UP (ref 0–0.5)
EOSINOPHIL NFR BLD AUTO: 0 % — SIGNIFICANT CHANGE UP (ref 0–6)
ESTIMATED AVERAGE GLUCOSE: 100 MG/DL — SIGNIFICANT CHANGE UP (ref 68–114)
GLUCOSE SERPL-MCNC: 187 MG/DL — HIGH (ref 70–99)
HCT VFR BLD CALC: 36.4 % — SIGNIFICANT CHANGE UP (ref 34.5–45)
HGB BLD-MCNC: 11.7 G/DL — SIGNIFICANT CHANGE UP (ref 11.5–15.5)
IMM GRANULOCYTES NFR BLD AUTO: 0.3 % — SIGNIFICANT CHANGE UP (ref 0–0.9)
INR BLD: 0.94 RATIO — SIGNIFICANT CHANGE UP (ref 0.85–1.18)
LYMPHOCYTES # BLD AUTO: 0.74 K/UL — LOW (ref 1–3.3)
LYMPHOCYTES # BLD AUTO: 8 % — LOW (ref 13–44)
MCHC RBC-ENTMCNC: 30.3 PG — SIGNIFICANT CHANGE UP (ref 27–34)
MCHC RBC-ENTMCNC: 32.1 GM/DL — SIGNIFICANT CHANGE UP (ref 32–36)
MCV RBC AUTO: 94.3 FL — SIGNIFICANT CHANGE UP (ref 80–100)
MONOCYTES # BLD AUTO: 0.49 K/UL — SIGNIFICANT CHANGE UP (ref 0–0.9)
MONOCYTES NFR BLD AUTO: 5.3 % — SIGNIFICANT CHANGE UP (ref 2–14)
MRSA PCR RESULT.: SIGNIFICANT CHANGE UP
NEUTROPHILS # BLD AUTO: 7.98 K/UL — HIGH (ref 1.8–7.4)
NEUTROPHILS NFR BLD AUTO: 86.4 % — HIGH (ref 43–77)
PLATELET # BLD AUTO: 205 K/UL — SIGNIFICANT CHANGE UP (ref 150–400)
POTASSIUM SERPL-MCNC: 4 MMOL/L — SIGNIFICANT CHANGE UP (ref 3.5–5.3)
POTASSIUM SERPL-SCNC: 4 MMOL/L — SIGNIFICANT CHANGE UP (ref 3.5–5.3)
PROTHROM AB SERPL-ACNC: 10.5 SEC — SIGNIFICANT CHANGE UP (ref 9.5–13)
RBC # BLD: 3.86 M/UL — SIGNIFICANT CHANGE UP (ref 3.8–5.2)
RBC # FLD: 13 % — SIGNIFICANT CHANGE UP (ref 10.3–14.5)
S AUREUS DNA NOSE QL NAA+PROBE: SIGNIFICANT CHANGE UP
SODIUM SERPL-SCNC: 140 MMOL/L — SIGNIFICANT CHANGE UP (ref 135–145)
WBC # BLD: 9.24 K/UL — SIGNIFICANT CHANGE UP (ref 3.8–10.5)
WBC # FLD AUTO: 9.24 K/UL — SIGNIFICANT CHANGE UP (ref 3.8–10.5)

## 2024-08-24 PROCEDURE — 80048 BASIC METABOLIC PNL TOTAL CA: CPT

## 2024-08-24 PROCEDURE — 87641 MR-STAPH DNA AMP PROBE: CPT

## 2024-08-24 PROCEDURE — 86901 BLOOD TYPING SEROLOGIC RH(D): CPT

## 2024-08-24 PROCEDURE — 83036 HEMOGLOBIN GLYCOSYLATED A1C: CPT

## 2024-08-24 PROCEDURE — 87640 STAPH A DNA AMP PROBE: CPT

## 2024-08-24 PROCEDURE — 93005 ELECTROCARDIOGRAM TRACING: CPT

## 2024-08-24 PROCEDURE — 36415 COLL VENOUS BLD VENIPUNCTURE: CPT

## 2024-08-24 PROCEDURE — 86850 RBC ANTIBODY SCREEN: CPT

## 2024-08-24 PROCEDURE — G0463: CPT

## 2024-08-24 PROCEDURE — 85025 COMPLETE CBC W/AUTO DIFF WBC: CPT

## 2024-08-24 PROCEDURE — 93010 ELECTROCARDIOGRAM REPORT: CPT

## 2024-08-24 PROCEDURE — 85610 PROTHROMBIN TIME: CPT

## 2024-08-24 PROCEDURE — 86900 BLOOD TYPING SEROLOGIC ABO: CPT

## 2024-08-24 PROCEDURE — 85730 THROMBOPLASTIN TIME PARTIAL: CPT

## 2024-08-24 NOTE — H&P PST ADULT - NSICDXFAMILYHX_GEN_ALL_CORE_FT
FAMILY HISTORY:  Mother  Still living? No  Family history of breast cancer, Age at diagnosis: Age Unknown    Sibling  Still living? Unknown  Family history of CVA, Age at diagnosis: Age Unknown

## 2024-08-24 NOTE — H&P PST ADULT - MUSCULOSKELETAL
details… no calf tenderness/abnormal gait no calf tenderness/strength 5/5 bilateral upper extremities/abnormal gait

## 2024-08-24 NOTE — H&P PST ADULT - PROBLEM SELECTOR PLAN 1
Left Total Knee Replacement. Patient is scheduled for right knee replacement with Dr Nazario on 9/12/24.  Medical and cardiac pending

## 2024-08-24 NOTE — H&P PST ADULT - CARDIOVASCULAR SYMPTOMS
Submitted request for authorization for CT foot.  Per AIM review, the Pre-cert RN is unable to complete due to not meeting AIM clinical guidelines.   Please have the ordering provider call [facility] for Peer to Peer discussion at [phone] and reference [facility] ID # [id number]    Case is due to close promptly at end of business day 3/17/20. The patient is scheduled for this exam TODAY at 6pm.    Please respond to the [facility] Rad Precert Pool  ([id number]) with the result of the Physician-to-Physician discussion.  Please include the Authorization Number and expiration date in your response.    Thank you     dyspnea on exertion/peripheral edema

## 2024-08-24 NOTE — H&P PST ADULT - ASSESSMENT
82F PMH HTN, Hyperlipidemia, Gout, Osteoporosis and Osteoarthritis for Left Total Knee Replacement. 89-year-old female with pmhx of HTN, HLD, osteoporosis, gout, carotid stenosis following with Dr Sullivan, OA s/p left knee replacement in 2017.  Patient presents today for complaint of right knee pain x 3 years, describes knee pain as both dull and sharp depending on activity, worse with prolonged standing, walking, pain is intermittent, 7/10 in severity, some relief with tylenol. She has tried injections with temporary relief, has tried PT. X-ray of the right knee reveal significant osteoarthritic changes in all 3 views. There is marked narrowing of the joint spacing in the patellofemoral compartment but mostly in the lateral femoral-tibial compartment. There is osteophyte formation as well as areas of sclerosis and cystic formation. There is no evidence of any fracture or dislocation noted. Exam of right knee skin intact, +swelling, + tenderness, dec ROM due to pain, ambulating with rolling walker. Patient is scheduled for right knee replacement with Dr Nazario on 24. Patient educated on surgical scrub, ERP, preadmission instructions, medical clearance and day of procedure medications, verbalizes understanding. Pt instructed to stop vitamins/supplements/herbal medications/NSAIDS for one week prior to surgery and discuss with PMD. Patient was given information on joint class, joint book provided, ERP protocol reviewed with patient, MSSA/MRSA swabbed in PST, results pending. Aspirin as per cardiologist/PCP, daughter verbalized understanding. Hold valsartan 24 hours, pt taking amlodipine/valsartan combination, BP not optimized today at PST, discussed with daughter that amlodipine does not need to be held, will discuss with PCP.     CAPRINI SCORE    AGE RELATED RISK FACTORS                                                             [ ] Age 41-60 years                                            (1 Point)  [ ] Age: 61-74 years                                           (2 Points)                 [x ] Age= 75 years                                                (3 Points)             DISEASE RELATED RISK FACTORS                                                       [ x] Edema in the lower extremities                 (1 Point)                     [ ] Varicose veins                                               (1 Point)                                 [x ] BMI > 25 Kg/m2                                            (1 Point)                                  [ ] Serious infection (ie PNA)                            (1 Point)                     [ ] Lung disease ( COPD, Emphysema)            (1 Point)                                                                          [ ] Acute myocardial infarction                         (1 Point)                  [ ] Congestive heart failure (in the previous month)  (1 Point)         [ ] Inflammatory bowel disease                            (1 Point)                  [ ] Central venous access, PICC or Port               (2 points)       (within the last month)                                                                [ ] Stroke (in the previous month)                        (5 Points)    [ ] Previous or present malignancy                       (2 points)                                                                                                                                                         HEMATOLOGY RELATED FACTORS                                                         [ ] Prior episodes of VTE                                     (3 Points)                     [ ] Positive family history for VTE                      (3 Points)                  [ ] Prothrombin 85624 A                                     (3 Points)                     [ ] Factor V Leiden                                                (3 Points)                        [ ] Lupus anticoagulants                                      (3 Points)                                                           [ ] Anticardiolipin antibodies                              (3 Points)                                                       [ ] High homocysteine in the blood                   (3 Points)                                             [ ] Other congenital or acquired thrombophilia      (3 Points)                                                [ ] Heparin induced thrombocytopenia                  (3 Points)                                        MOBILITY RELATED FACTORS  [ ] Bed rest                                                         (1 Point)  [ ] Plaster cast                                                    (2 points)  [ ] Bed bound for more than 72 hours           (2 Points)    GENDER SPECIFIC FACTORS  [ ] Pregnancy or had a baby within the last month   (1 Point)  [ ] Post-partum < 6 weeks                                   (1 Point)  [ ] Hormonal therapy  or oral contraception   (1 Point)  [ ] History of pregnancy complications              (1 point)  [ ] Unexplained or recurrent              (1 Point)    OTHER RISK FACTORS                                           (1 Point)  [ ] BMI >40, smoking, diabetes requiring insulin, chemotherapy  blood transfusions and length of surgery over 2 hours    SURGERY RELATED RISK FACTORS  [ ]  Section within the last month     (1 Point)  [ ] Minor surgery                                                  (1 Point)  [ ] Arthroscopic surgery                                       (2 Points)  [ x] Planned major surgery lasting more            (2 Points)      than 45 minutes     [ ] Elective hip or knee joint replacement       (5 points)       surgery                                                TRAUMA RELATED RISK FACTORS  [ ] Fracture of the hip, pelvis, or leg                       (5 Points)  [ ] Spinal cord injury resulting in paralysis             (5 points)       (in the previous month)    [ ] Paralysis  (less than 1 month)                             (5 Points)  [ ] Multiple Trauma within 1 month                        (5 Points)    Total Score [  10      ]    Caprini Score 0-2: Low Risk, NO VTE prophylaxis required for most patients, encourage ambulation  Caprini Score 3-6: Moderate Risk , pharmacologic VTE prophylaxis is indicated for most patients (in the absence of contraindications)  Caprini Score Greater than or =7: High risk, pharmocologic VTE prophylaxis indicated for most patients (in the absence of contraindications)    OPIOID RISK TOOL    SAIMA EACH BOX THAT APPLIES AND ADD TOTALS AT THE END    FAMILY HISTORY OF SUBSTANCE ABUSE                 FEMALE         MALE                                                Alcohol                             [  ]1 pt          [  ]3pts                                               Illegal Durgs                     [  ]2 pts        [  ]3pts                                               Rx Drugs                           [  ]4 pts        [  ]4 pts    PERSONAL HISTORY OF SUBSTANCE ABUSE                                                                                          Alcohol                             [  ]3 pts       [  ]3 pts                                               Illegal Durgs                     [  ]4 pts        [  ]4 pts                                               Rx Drugs                           [  ]5 pts        [  ]5 pts    AGE BETWEEN 16-45 YEARS                                      [  ]1 pt         [  ]1 pt    HISTORY OF PREADOLESCENT   SEXUAL ABUSE                                                             [  ]3 pts        [  ]0pts    PSYCHOLOGICAL DISEASE                     ADD, OCD, Bipolar, Schizophrenia        [  ]2 pts         [  ]2 pts                      Depression                                               [  ]1 pt           [  ]1 pt           SCORING TOTAL  0                             A score of 3 or lower indicated LOW risk for future opiod abuse  A score of 4 to 7 indicated moderate risk for future opiod abuse  A score of 8 or higher indicates a high risk for opiod abuse

## 2024-08-24 NOTE — H&P PST ADULT - CARDIOVASCULAR
details… +2 pitting edema right shin, +1 pitting edema left shin/regular rate and rhythm/S1 S2 present/murmur/peripheral edema

## 2024-08-24 NOTE — H&P PST ADULT - NSICDXPASTMEDICALHX_GEN_ALL_CORE_FT
PAST MEDICAL HISTORY:  Carotid stenosis     Gout     HTN (hypertension)     Hyperlipidemia     Osteoarthritis     Osteoporosis     Unilateral primary osteoarthritis, right knee

## 2024-08-24 NOTE — H&P PST ADULT - PROBLEM SELECTOR PLAN 2
Medical and Cardiac clearances pending. Pts daughter reports hx of carotid stenosis, no bruit heard on exam bilaterally   has carotid us scheduled with cardiologist  Cardiac pending

## 2024-08-24 NOTE — H&P PST ADULT - HISTORY OF PRESENT ILLNESS
left knee 2017   Patient is a 89-year-old female who presents today for an evaluation for both knees. She is status post left total knee replacement which was performed approximately 10 years ago. She states that she has been doing well but noticed that due to the severity of her right knee. That she is ambulating differently.  Increased discomfort in her left. She denies any history of injury or accident. In her left she has been diagnosed with significant osteoarthritic changes, and was scheduled for a knee replacement. However due to the daughters health concerns. This was canceled. She returns today still complaining of significant pain in the right knee.     Pt states that all conservative measures were tried and failed. Not only for the past several years but consistently, at least, for the past six months. This includes Physical Therapy, home exercises, weight management, medications including pain meds and NSAIDs as well as intra articular injections such as cortisone and visco supplementation injections. As time has progressed, the pain has become more pronounced. Pt presents today for an evaluation and is a non-smoker.         She states the symptoms are worsening.  89-year-old female with pmhx of HTN, HLD, osteoporosis, gout, carotid stenosis following with Dr Sullivan, OA s/p left knee replacement in 2017.  Patient presents today for complaint of right knee pain x 3 years, describes knee pain as both dull and sharp depending on activity, worse with prolonged standing, walking, pain is intermittent, 7/10 in severity, some relief with tylenol.   has tried injections with temporary relief, has tried PT  Patient is scheduled for right knee replacement with Dr Nazario on 9/12/24.  Medical and cardiac evaluation pending    89-year-old female with pmhx of HTN, HLD, osteoporosis, gout, carotid stenosis following with Dr Sullivan, OA s/p left knee replacement in 2017.  Patient presents today for complaint of right knee pain x 3 years, describes knee pain as both dull and sharp depending on activity, worse with prolonged standing, walking, pain is intermittent, 7/10 in severity, some relief with tylenol. She has tried injections with temporary relief, has tried PT. X-ray of the right knee reveal significant osteoarthritic changes in all 3 views. There is marked narrowing of the joint spacing in the patellofemoral compartment but mostly in the lateral femoral-tibial compartment. There is osteophyte formation as well as areas of sclerosis and cystic formation. There is no evidence of any fracture or dislocation noted. Patient is scheduled for right knee replacement with Dr Nazario on 9/12/24.  Medical and cardiac evaluation pending

## 2024-08-24 NOTE — H&P PST ADULT - PROBLEM SELECTOR PLAN 5
BP elevated today 160/80, pt taking medication as prescribed, encouraged to decrease sodium intake, BP recheck with pcp/cardiologist, Denies dizziness, CP, headache   EKG

## 2024-09-01 NOTE — H&P PST ADULT - BREASTS COMMENTS
Pt arrived via EMS for lowe abdominal pain and back pain, denies vaginal bleeding or LOF.    Refused

## 2024-09-05 RX ORDER — POVIDONE-IODINE 7.5 %
1 SOLUTION, NON-ORAL TOPICAL ONCE
Refills: 0 | Status: DISCONTINUED | OUTPATIENT
Start: 2024-09-12 | End: 2024-09-12

## 2024-09-09 ENCOUNTER — NON-APPOINTMENT (OUTPATIENT)
Age: 89
End: 2024-09-09

## 2024-09-10 PROBLEM — I65.29 OCCLUSION AND STENOSIS OF UNSPECIFIED CAROTID ARTERY: Chronic | Status: ACTIVE | Noted: 2024-08-24

## 2024-09-10 PROBLEM — M17.11 UNILATERAL PRIMARY OSTEOARTHRITIS, RIGHT KNEE: Chronic | Status: ACTIVE | Noted: 2024-08-24

## 2024-09-11 RX ADMIN — Medication 975 MILLIGRAM(S): at 22:00

## 2024-09-12 ENCOUNTER — INPATIENT (INPATIENT)
Facility: HOSPITAL | Age: 89
LOS: 2 days | Discharge: HOME | DRG: 554 | End: 2024-09-15
Attending: ORTHOPAEDIC SURGERY | Admitting: ORTHOPAEDIC SURGERY
Payer: MEDICARE

## 2024-09-12 ENCOUNTER — TRANSCRIPTION ENCOUNTER (OUTPATIENT)
Age: 89
End: 2024-09-12

## 2024-09-12 ENCOUNTER — APPOINTMENT (OUTPATIENT)
Dept: ORTHOPEDIC SURGERY | Facility: HOSPITAL | Age: 89
End: 2024-09-12

## 2024-09-12 VITALS
DIASTOLIC BLOOD PRESSURE: 60 MMHG | HEART RATE: 95 BPM | OXYGEN SATURATION: 99 % | SYSTOLIC BLOOD PRESSURE: 152 MMHG | HEIGHT: 64 IN | TEMPERATURE: 99 F | RESPIRATION RATE: 16 BRPM | WEIGHT: 169.98 LBS

## 2024-09-12 DIAGNOSIS — M17.11 UNILATERAL PRIMARY OSTEOARTHRITIS, RIGHT KNEE: ICD-10-CM

## 2024-09-12 DIAGNOSIS — Z96.652 PRESENCE OF LEFT ARTIFICIAL KNEE JOINT: Chronic | ICD-10-CM

## 2024-09-12 PROCEDURE — 27447 TOTAL KNEE ARTHROPLASTY: CPT | Mod: RT

## 2024-09-12 PROCEDURE — 73560 X-RAY EXAM OF KNEE 1 OR 2: CPT | Mod: 26,RT

## 2024-09-12 PROCEDURE — 99222 1ST HOSP IP/OBS MODERATE 55: CPT

## 2024-09-12 DEVICE — IMPLANTABLE DEVICE: Type: IMPLANTABLE DEVICE | Site: RIGHT | Status: FUNCTIONAL

## 2024-09-12 DEVICE — INSERT TIB NONPOROUS UNIV SZ 7 RT: Type: IMPLANTABLE DEVICE | Site: RIGHT | Status: FUNCTIONAL

## 2024-09-12 DEVICE — ORTHOALIGN THREADED PIN HEADED: Type: IMPLANTABLE DEVICE | Site: RIGHT | Status: FUNCTIONAL

## 2024-09-12 DEVICE — STEM MOD UNIV TIB 12X40MM: Type: IMPLANTABLE DEVICE | Site: RIGHT | Status: FUNCTIONAL

## 2024-09-12 DEVICE — STEM FEM MOD UNIV TIB 12X65MM: Type: IMPLANTABLE DEVICE | Site: RIGHT | Status: FUNCTIONAL

## 2024-09-12 DEVICE — COMP PATELLA TRI-PEG E-PLUS POLY 9X35MM: Type: IMPLANTABLE DEVICE | Site: RIGHT | Status: FUNCTIONAL

## 2024-09-12 DEVICE — CEMENT PALACOS R PRO HI VIS W/O GENTAMICIN 80G: Type: IMPLANTABLE DEVICE | Site: RIGHT | Status: FUNCTIONAL

## 2024-09-12 DEVICE — BONE WAX 2.5GM: Type: IMPLANTABLE DEVICE | Site: RIGHT | Status: FUNCTIONAL

## 2024-09-12 RX ORDER — CEFAZOLIN SODIUM IN 0.9 % NACL 3 G/100 ML
2000 INTRAVENOUS SOLUTION, PIGGYBACK (ML) INTRAVENOUS
Refills: 0 | Status: COMPLETED | OUTPATIENT
Start: 2024-09-12 | End: 2024-09-12

## 2024-09-12 RX ORDER — CEFAZOLIN SODIUM IN 0.9 % NACL 3 G/100 ML
2000 INTRAVENOUS SOLUTION, PIGGYBACK (ML) INTRAVENOUS ONCE
Refills: 0 | Status: DISCONTINUED | OUTPATIENT
Start: 2024-09-12 | End: 2024-09-12

## 2024-09-12 RX ORDER — HYDROMORPHONE/SOD CHLOR,ISO/PF 2 MG/10 ML
4 SYRINGE (ML) INJECTION
Refills: 0 | Status: DISCONTINUED | OUTPATIENT
Start: 2024-09-12 | End: 2024-09-15

## 2024-09-12 RX ORDER — MAGNESIUM, ALUMINUM HYDROXIDE 200-200 MG
30 TABLET,CHEWABLE ORAL
Refills: 0 | Status: DISCONTINUED | OUTPATIENT
Start: 2024-09-12 | End: 2024-09-15

## 2024-09-12 RX ORDER — SENNA 187 MG
2 TABLET ORAL AT BEDTIME
Refills: 0 | Status: DISCONTINUED | OUTPATIENT
Start: 2024-09-12 | End: 2024-09-15

## 2024-09-12 RX ORDER — CELECOXIB 50 MG/1
400 CAPSULE ORAL ONCE
Refills: 0 | Status: COMPLETED | OUTPATIENT
Start: 2024-09-12 | End: 2024-09-12

## 2024-09-12 RX ORDER — OXYCODONE HYDROCHLORIDE 30 MG/1
10 TABLET ORAL
Refills: 0 | Status: DISCONTINUED | OUTPATIENT
Start: 2024-09-12 | End: 2024-09-15

## 2024-09-12 RX ORDER — MAGNESIUM HYDROXIDE 400 MG/5ML
30 SUSPENSION ORAL DAILY
Refills: 0 | Status: DISCONTINUED | OUTPATIENT
Start: 2024-09-12 | End: 2024-09-15

## 2024-09-12 RX ORDER — ONDANSETRON HCL/PF 4 MG/2 ML
4 VIAL (ML) INJECTION EVERY 6 HOURS
Refills: 0 | Status: DISCONTINUED | OUTPATIENT
Start: 2024-09-12 | End: 2024-09-15

## 2024-09-12 RX ORDER — ATORVASTATIN CALCIUM 80 MG/1
10 TABLET, FILM COATED ORAL AT BEDTIME
Refills: 0 | Status: DISCONTINUED | OUTPATIENT
Start: 2024-09-12 | End: 2024-09-15

## 2024-09-12 RX ORDER — ASPIRIN 325 MG
81 TABLET ORAL
Refills: 0 | Status: DISCONTINUED | OUTPATIENT
Start: 2024-09-13 | End: 2024-09-15

## 2024-09-12 RX ORDER — METOPROLOL SUCCINATE 50 MG/1
50 TABLET, EXTENDED RELEASE ORAL DAILY
Refills: 0 | Status: DISCONTINUED | OUTPATIENT
Start: 2024-09-12 | End: 2024-09-15

## 2024-09-12 RX ORDER — FUROSEMIDE 10 MG/ML
40 INJECTION INTRAMUSCULAR; INTRAVENOUS DAILY
Refills: 0 | Status: DISCONTINUED | OUTPATIENT
Start: 2024-09-12 | End: 2024-09-15

## 2024-09-12 RX ORDER — TRANEXAMIC ACID 1000 MG/10
1000 AMPUL (ML) INTRAVENOUS ONCE
Refills: 0 | Status: DISCONTINUED | OUTPATIENT
Start: 2024-09-12 | End: 2024-09-12

## 2024-09-12 RX ORDER — POLYETHYLENE GLYCOL 3350 17 G/17G
17 POWDER, FOR SOLUTION ORAL AT BEDTIME
Refills: 0 | Status: DISCONTINUED | OUTPATIENT
Start: 2024-09-12 | End: 2024-09-15

## 2024-09-12 RX ORDER — RALOXIFENE HYDROCHLORIDE 60 MG/1
60 TABLET, FILM COATED ORAL DAILY
Refills: 0 | Status: DISCONTINUED | OUTPATIENT
Start: 2024-09-12 | End: 2024-09-15

## 2024-09-12 RX ORDER — FUROSEMIDE 40 MG
1 TABLET ORAL
Refills: 0 | DISCHARGE

## 2024-09-12 RX ORDER — ONDANSETRON HCL/PF 4 MG/2 ML
4 VIAL (ML) INJECTION ONCE
Refills: 0 | Status: DISCONTINUED | OUTPATIENT
Start: 2024-09-12 | End: 2024-09-12

## 2024-09-12 RX ORDER — ACETAMINOPHEN 500 MG/5ML
1000 LIQUID (ML) ORAL ONCE
Refills: 0 | Status: COMPLETED | OUTPATIENT
Start: 2024-09-12 | End: 2024-09-13

## 2024-09-12 RX ORDER — POTASSIUM CHLORIDE 10 MEQ
10 TABLET, EXT RELEASE, PARTICLES/CRYSTALS ORAL
Refills: 0 | DISCHARGE

## 2024-09-12 RX ORDER — ACETAMINOPHEN 500 MG/5ML
975 LIQUID (ML) ORAL EVERY 8 HOURS
Refills: 0 | Status: DISCONTINUED | OUTPATIENT
Start: 2024-09-12 | End: 2024-09-15

## 2024-09-12 RX ORDER — METOPROLOL TARTRATE 100 MG/1
1 TABLET ORAL
Refills: 0 | DISCHARGE

## 2024-09-12 RX ORDER — SODIUM CHLORIDE 9 G/1000ML
1000 INJECTION, SOLUTION INTRAVENOUS
Refills: 0 | Status: DISCONTINUED | OUTPATIENT
Start: 2024-09-12 | End: 2024-09-12

## 2024-09-12 RX ORDER — OXYCODONE HYDROCHLORIDE 30 MG/1
5 TABLET ORAL
Refills: 0 | Status: DISCONTINUED | OUTPATIENT
Start: 2024-09-12 | End: 2024-09-15

## 2024-09-12 RX ORDER — FENTANYL CITRATE-0.9 % NACL/PF 100MCG/2ML
25 SYRINGE (ML) INTRAVENOUS
Refills: 0 | Status: DISCONTINUED | OUTPATIENT
Start: 2024-09-12 | End: 2024-09-12

## 2024-09-12 RX ORDER — ALLOPURINOL 300 MG
150 TABLET ORAL
Refills: 0 | DISCHARGE

## 2024-09-12 RX ORDER — AMLODIPINE AND VALSARTAN 10; 320 MG/1; MG/1
1 TABLET ORAL
Refills: 0 | DISCHARGE

## 2024-09-12 RX ORDER — ACETAMINOPHEN 500 MG/5ML
975 LIQUID (ML) ORAL ONCE
Refills: 0 | Status: COMPLETED | OUTPATIENT
Start: 2024-09-12 | End: 2024-09-12

## 2024-09-12 RX ADMIN — ATORVASTATIN CALCIUM 10 MILLIGRAM(S): 80 TABLET, FILM COATED ORAL at 21:03

## 2024-09-12 RX ADMIN — Medication 975 MILLIGRAM(S): at 06:28

## 2024-09-12 RX ADMIN — Medication 2000 MILLIGRAM(S): at 14:24

## 2024-09-12 RX ADMIN — Medication 1 APPLICATION(S): at 07:38

## 2024-09-12 RX ADMIN — Medication 100 MILLILITER(S): at 20:59

## 2024-09-12 RX ADMIN — Medication 975 MILLIGRAM(S): at 21:04

## 2024-09-12 RX ADMIN — Medication 975 MILLIGRAM(S): at 14:24

## 2024-09-12 RX ADMIN — Medication 2 TABLET(S): at 21:04

## 2024-09-12 RX ADMIN — Medication 2000 MILLIGRAM(S): at 21:00

## 2024-09-12 RX ADMIN — CELECOXIB 400 MILLIGRAM(S): 50 CAPSULE ORAL at 06:28

## 2024-09-12 RX ADMIN — Medication 975 MILLIGRAM(S): at 14:54

## 2024-09-12 RX ADMIN — Medication 100 MILLILITER(S): at 14:23

## 2024-09-13 ENCOUNTER — TRANSCRIPTION ENCOUNTER (OUTPATIENT)
Age: 89
End: 2024-09-13

## 2024-09-13 LAB
ANION GAP SERPL CALC-SCNC: 9 MMOL/L — SIGNIFICANT CHANGE UP (ref 5–17)
BUN SERPL-MCNC: 14.2 MG/DL — SIGNIFICANT CHANGE UP (ref 8–20)
CALCIUM SERPL-MCNC: 8 MG/DL — LOW (ref 8.4–10.5)
CHLORIDE SERPL-SCNC: 107 MMOL/L — SIGNIFICANT CHANGE UP (ref 96–108)
CO2 SERPL-SCNC: 25 MMOL/L — SIGNIFICANT CHANGE UP (ref 22–29)
CREAT SERPL-MCNC: 0.83 MG/DL — SIGNIFICANT CHANGE UP (ref 0.5–1.3)
EGFR: 68 ML/MIN/1.73M2 — SIGNIFICANT CHANGE UP
EGFR: 68 ML/MIN/1.73M2 — SIGNIFICANT CHANGE UP
GLUCOSE SERPL-MCNC: 110 MG/DL — HIGH (ref 70–99)
HCT VFR BLD CALC: 28.7 % — LOW (ref 34.5–45)
HGB BLD-MCNC: 9.2 G/DL — LOW (ref 11.5–15.5)
MCHC RBC-ENTMCNC: 30.4 PG — SIGNIFICANT CHANGE UP (ref 27–34)
MCHC RBC-ENTMCNC: 32.1 GM/DL — SIGNIFICANT CHANGE UP (ref 32–36)
MCV RBC AUTO: 94.7 FL — SIGNIFICANT CHANGE UP (ref 80–100)
PLATELET # BLD AUTO: 126 K/UL — LOW (ref 150–400)
POTASSIUM SERPL-MCNC: 3.8 MMOL/L — SIGNIFICANT CHANGE UP (ref 3.5–5.3)
POTASSIUM SERPL-SCNC: 3.8 MMOL/L — SIGNIFICANT CHANGE UP (ref 3.5–5.3)
RBC # BLD: 3.03 M/UL — LOW (ref 3.8–5.2)
RBC # FLD: 13.2 % — SIGNIFICANT CHANGE UP (ref 10.3–14.5)
SODIUM SERPL-SCNC: 141 MMOL/L — SIGNIFICANT CHANGE UP (ref 135–145)
WBC # BLD: 11.03 K/UL — HIGH (ref 3.8–10.5)
WBC # FLD AUTO: 11.03 K/UL — HIGH (ref 3.8–10.5)

## 2024-09-13 PROCEDURE — 99232 SBSQ HOSP IP/OBS MODERATE 35: CPT

## 2024-09-13 RX ORDER — OXYCODONE HYDROCHLORIDE 30 MG/1
1 TABLET ORAL
Qty: 0 | Refills: 0 | DISCHARGE
Start: 2024-09-13

## 2024-09-13 RX ORDER — SENNA 187 MG
2 TABLET ORAL
Qty: 0 | Refills: 0 | DISCHARGE
Start: 2024-09-13

## 2024-09-13 RX ORDER — ACETAMINOPHEN 500 MG/5ML
3 LIQUID (ML) ORAL
Qty: 0 | Refills: 0 | DISCHARGE
Start: 2024-09-13

## 2024-09-13 RX ORDER — ASPIRIN 325 MG
1 TABLET ORAL
Qty: 0 | Refills: 0 | DISCHARGE
Start: 2024-09-13

## 2024-09-13 RX ADMIN — Medication 40 MILLIGRAM(S): at 06:03

## 2024-09-13 RX ADMIN — Medication 81 MILLIGRAM(S): at 06:03

## 2024-09-13 RX ADMIN — Medication 400 MILLIGRAM(S): at 06:03

## 2024-09-13 RX ADMIN — RALOXIFENE HYDROCHLORIDE 60 MILLIGRAM(S): 60 TABLET, FILM COATED ORAL at 15:11

## 2024-09-13 RX ADMIN — Medication 975 MILLIGRAM(S): at 15:45

## 2024-09-13 RX ADMIN — Medication 975 MILLIGRAM(S): at 15:11

## 2024-09-13 RX ADMIN — Medication 75 MILLILITER(S): at 10:19

## 2024-09-13 RX ADMIN — OXYCODONE HYDROCHLORIDE 5 MILLIGRAM(S): 30 TABLET ORAL at 20:36

## 2024-09-13 RX ADMIN — Medication 150 MILLIGRAM(S): at 15:12

## 2024-09-13 RX ADMIN — OXYCODONE HYDROCHLORIDE 5 MILLIGRAM(S): 30 TABLET ORAL at 21:36

## 2024-09-13 RX ADMIN — Medication 975 MILLIGRAM(S): at 21:00

## 2024-09-13 RX ADMIN — METOPROLOL SUCCINATE 50 MILLIGRAM(S): 50 TABLET, EXTENDED RELEASE ORAL at 10:19

## 2024-09-13 RX ADMIN — ATORVASTATIN CALCIUM 10 MILLIGRAM(S): 80 TABLET, FILM COATED ORAL at 20:36

## 2024-09-13 RX ADMIN — Medication 975 MILLIGRAM(S): at 20:36

## 2024-09-13 RX ADMIN — Medication 1000 MILLIGRAM(S): at 06:27

## 2024-09-13 RX ADMIN — Medication 81 MILLIGRAM(S): at 18:16

## 2024-09-13 RX ADMIN — Medication 75 MILLILITER(S): at 06:03

## 2024-09-13 RX ADMIN — FUROSEMIDE 40 MILLIGRAM(S): 10 INJECTION INTRAMUSCULAR; INTRAVENOUS at 10:20

## 2024-09-14 LAB
ANION GAP SERPL CALC-SCNC: 8 MMOL/L — SIGNIFICANT CHANGE UP (ref 5–17)
BUN SERPL-MCNC: 12.2 MG/DL — SIGNIFICANT CHANGE UP (ref 8–20)
CALCIUM SERPL-MCNC: 7.8 MG/DL — LOW (ref 8.4–10.5)
CHLORIDE SERPL-SCNC: 110 MMOL/L — HIGH (ref 96–108)
CO2 SERPL-SCNC: 26 MMOL/L — SIGNIFICANT CHANGE UP (ref 22–29)
CREAT SERPL-MCNC: 0.82 MG/DL — SIGNIFICANT CHANGE UP (ref 0.5–1.3)
EGFR: 69 ML/MIN/1.73M2 — SIGNIFICANT CHANGE UP
EGFR: 69 ML/MIN/1.73M2 — SIGNIFICANT CHANGE UP
GLUCOSE SERPL-MCNC: 100 MG/DL — HIGH (ref 70–99)
HCT VFR BLD CALC: 29.4 % — LOW (ref 34.5–45)
HGB BLD-MCNC: 9.1 G/DL — LOW (ref 11.5–15.5)
MCHC RBC-ENTMCNC: 30.3 PG — SIGNIFICANT CHANGE UP (ref 27–34)
MCHC RBC-ENTMCNC: 31 GM/DL — LOW (ref 32–36)
MCV RBC AUTO: 98 FL — SIGNIFICANT CHANGE UP (ref 80–100)
PLATELET # BLD AUTO: 133 K/UL — LOW (ref 150–400)
POTASSIUM SERPL-MCNC: 3.6 MMOL/L — SIGNIFICANT CHANGE UP (ref 3.5–5.3)
POTASSIUM SERPL-SCNC: 3.6 MMOL/L — SIGNIFICANT CHANGE UP (ref 3.5–5.3)
RBC # BLD: 3 M/UL — LOW (ref 3.8–5.2)
RBC # FLD: 13.2 % — SIGNIFICANT CHANGE UP (ref 10.3–14.5)
SODIUM SERPL-SCNC: 144 MMOL/L — SIGNIFICANT CHANGE UP (ref 135–145)
WBC # BLD: 9.23 K/UL — SIGNIFICANT CHANGE UP (ref 3.8–10.5)
WBC # FLD AUTO: 9.23 K/UL — SIGNIFICANT CHANGE UP (ref 3.8–10.5)

## 2024-09-14 PROCEDURE — 99233 SBSQ HOSP IP/OBS HIGH 50: CPT

## 2024-09-14 PROCEDURE — 71045 X-RAY EXAM CHEST 1 VIEW: CPT | Mod: 26

## 2024-09-14 RX ORDER — AMLODIPINE BESYLATE 10 MG/1
10 TABLET ORAL DAILY
Refills: 0 | Status: DISCONTINUED | OUTPATIENT
Start: 2024-09-14 | End: 2024-09-15

## 2024-09-14 RX ORDER — FUROSEMIDE 10 MG/ML
20 INJECTION INTRAMUSCULAR; INTRAVENOUS ONCE
Refills: 0 | Status: COMPLETED | OUTPATIENT
Start: 2024-09-14 | End: 2024-09-14

## 2024-09-14 RX ADMIN — Medication 975 MILLIGRAM(S): at 22:37

## 2024-09-14 RX ADMIN — Medication 975 MILLIGRAM(S): at 14:52

## 2024-09-14 RX ADMIN — Medication 975 MILLIGRAM(S): at 05:34

## 2024-09-14 RX ADMIN — FUROSEMIDE 40 MILLIGRAM(S): 10 INJECTION INTRAMUSCULAR; INTRAVENOUS at 08:44

## 2024-09-14 RX ADMIN — RALOXIFENE HYDROCHLORIDE 60 MILLIGRAM(S): 60 TABLET, FILM COATED ORAL at 13:51

## 2024-09-14 RX ADMIN — Medication 320 MILLIGRAM(S): at 14:55

## 2024-09-14 RX ADMIN — Medication 2 TABLET(S): at 21:38

## 2024-09-14 RX ADMIN — AMLODIPINE BESYLATE 10 MILLIGRAM(S): 10 TABLET ORAL at 13:51

## 2024-09-14 RX ADMIN — Medication 975 MILLIGRAM(S): at 13:52

## 2024-09-14 RX ADMIN — Medication 150 MILLIGRAM(S): at 13:52

## 2024-09-14 RX ADMIN — Medication 40 MILLIGRAM(S): at 05:34

## 2024-09-14 RX ADMIN — METOPROLOL SUCCINATE 50 MILLIGRAM(S): 50 TABLET, EXTENDED RELEASE ORAL at 08:44

## 2024-09-14 RX ADMIN — Medication 10 MILLIGRAM(S): at 05:35

## 2024-09-14 RX ADMIN — ATORVASTATIN CALCIUM 10 MILLIGRAM(S): 80 TABLET, FILM COATED ORAL at 21:37

## 2024-09-14 RX ADMIN — Medication 975 MILLIGRAM(S): at 06:13

## 2024-09-14 RX ADMIN — Medication 81 MILLIGRAM(S): at 17:33

## 2024-09-14 RX ADMIN — Medication 81 MILLIGRAM(S): at 05:34

## 2024-09-14 RX ADMIN — Medication 100 MILLIGRAM(S): at 17:33

## 2024-09-14 RX ADMIN — Medication 975 MILLIGRAM(S): at 21:37

## 2024-09-14 RX ADMIN — FUROSEMIDE 20 MILLIGRAM(S): 10 INJECTION INTRAMUSCULAR; INTRAVENOUS at 14:55

## 2024-09-15 VITALS
SYSTOLIC BLOOD PRESSURE: 134 MMHG | DIASTOLIC BLOOD PRESSURE: 77 MMHG | RESPIRATION RATE: 18 BRPM | OXYGEN SATURATION: 92 % | HEART RATE: 70 BPM

## 2024-09-15 PROCEDURE — 99232 SBSQ HOSP IP/OBS MODERATE 35: CPT

## 2024-09-15 RX ADMIN — AMLODIPINE BESYLATE 10 MILLIGRAM(S): 10 TABLET ORAL at 05:29

## 2024-09-15 RX ADMIN — METOPROLOL SUCCINATE 50 MILLIGRAM(S): 50 TABLET, EXTENDED RELEASE ORAL at 08:45

## 2024-09-15 RX ADMIN — MAGNESIUM HYDROXIDE 30 MILLILITER(S): 400 SUSPENSION ORAL at 07:54

## 2024-09-15 RX ADMIN — Medication 975 MILLIGRAM(S): at 05:28

## 2024-09-15 RX ADMIN — FUROSEMIDE 40 MILLIGRAM(S): 10 INJECTION INTRAMUSCULAR; INTRAVENOUS at 08:46

## 2024-09-15 RX ADMIN — Medication 975 MILLIGRAM(S): at 06:28

## 2024-09-15 RX ADMIN — Medication 81 MILLIGRAM(S): at 05:29

## 2024-09-15 RX ADMIN — Medication 320 MILLIGRAM(S): at 05:29

## 2024-09-15 RX ADMIN — Medication 40 MILLIGRAM(S): at 05:28

## 2024-09-25 ENCOUNTER — TRANSCRIPTION ENCOUNTER (OUTPATIENT)
Age: 89
End: 2024-09-25

## 2024-09-27 ENCOUNTER — APPOINTMENT (OUTPATIENT)
Dept: ORTHOPEDIC SURGERY | Facility: CLINIC | Age: 89
End: 2024-09-27
Payer: MEDICARE

## 2024-09-27 DIAGNOSIS — Z96.651 PRESENCE OF RIGHT ARTIFICIAL KNEE JOINT: ICD-10-CM

## 2024-09-27 PROCEDURE — 73562 X-RAY EXAM OF KNEE 3: CPT | Mod: RT

## 2024-09-27 PROCEDURE — 99024 POSTOP FOLLOW-UP VISIT: CPT

## 2024-09-27 RX ORDER — AMOXICILLIN 500 MG/1
500 CAPSULE ORAL
Qty: 20 | Refills: 2 | Status: ACTIVE | COMMUNITY
Start: 2024-09-27 | End: 1900-01-01

## 2024-09-27 NOTE — HISTORY OF PRESENT ILLNESS
[de-identified] : right TKR 9/12/24 [de-identified] : Patient presents today with granddaughter for reevaluation of a right knee arthroplasty.  She is approximately 2 weeks from surgery.  She is doing well.  She is using a rolling walker at home.  She started to use a cane.  She is finishing up home PT.  She will be transitioning to outpatient PT next week.  She is currently taking aspirin for DVT prophylaxis.  She denies of any wound complications.  She does have some mild swelling of the right lower leg.  No other related complaints. [de-identified] : Postop total knee replacement exam  Exam of the right knee reveals that the patient is post knee replacement surgery.  The incision is clean, dry, and intact with Monocryl wound closure noted. No wound dehisence noted. There is no erythema.   There is small effusion. No calf tenderness. No venous stasis or open skin wounds distally. No foot drop. Sensation is intact to light touch.  Mild nonpitting edema of both lower extremities which is greater right than left.  No open wounds distally.  1- Alignment: measured on AP standing Xray Anatomic Alignment Neutral   2- Medial / Lateral Instability: measured in full extension  None   3- Anterior / Posterior Instability: measured at 90 degrees  None  4 - Range of motion is from -5 degrees to 100 degrees.   5- Flexion Contracture -5  6- Extensor Lag Minus Points no [de-identified] : Three view x-rays of the right knee were reviewed.  Primary stemmed implants are in good position. No signs of loosening or fracture. [de-identified] : Status post right knee arthroplasty at 2 weeks doing well [de-identified] : X-rays reviewed with the patient. The patient was advised to continue their routine activities of daily living within tolerances , home exercises as guided by P.T., and continue outpatient PT till goals are achieved. A prescription was given for continued out-patient  PT modalities, strengthening, and  ROM.  The patient and granddaughter are encouraged to perform stretching exercises of the hamstrings daily.  Instruction education were provided.  The patient was advised to continue with antibiotic prophylaxis for dental procedures and regular interval orthopedic follow-up post total knee arthroplasty. The patient will continue the current DVT prophylaxis plan. They may contact our office if any new problems arise for urgent orthopedic re-evaluation. Patient will be seen back in 6 weeks for reevaluation.

## 2024-09-29 PROCEDURE — C1889: CPT

## 2024-09-29 PROCEDURE — C1776: CPT

## 2024-09-29 PROCEDURE — 80048 BASIC METABOLIC PNL TOTAL CA: CPT

## 2024-09-29 PROCEDURE — 36415 COLL VENOUS BLD VENIPUNCTURE: CPT

## 2024-09-29 PROCEDURE — 71045 X-RAY EXAM CHEST 1 VIEW: CPT

## 2024-09-29 PROCEDURE — 73560 X-RAY EXAM OF KNEE 1 OR 2: CPT

## 2024-09-29 PROCEDURE — 85027 COMPLETE CBC AUTOMATED: CPT

## 2024-09-29 PROCEDURE — C1713: CPT

## 2024-10-25 ENCOUNTER — TRANSCRIPTION ENCOUNTER (OUTPATIENT)
Age: 89
End: 2024-10-25

## 2024-10-28 ENCOUNTER — TRANSCRIPTION ENCOUNTER (OUTPATIENT)
Age: 89
End: 2024-10-28

## 2024-11-13 ENCOUNTER — TRANSCRIPTION ENCOUNTER (OUTPATIENT)
Age: 89
End: 2024-11-13

## 2024-11-15 ENCOUNTER — APPOINTMENT (OUTPATIENT)
Dept: ORTHOPEDIC SURGERY | Facility: CLINIC | Age: 89
End: 2024-11-15
Payer: MEDICARE

## 2024-11-15 PROCEDURE — 99024 POSTOP FOLLOW-UP VISIT: CPT

## 2024-11-15 PROCEDURE — 73562 X-RAY EXAM OF KNEE 3: CPT | Mod: RT

## 2024-11-22 ENCOUNTER — APPOINTMENT (OUTPATIENT)
Dept: ORTHOPEDIC SURGERY | Facility: CLINIC | Age: 89
End: 2024-11-22

## 2024-12-16 NOTE — DISCHARGE NOTE ADULT - NS AS DC FU INST LIST INST
[FreeTextEntry1] : FRANSISCA presents today with gassiness, she is getting formula every 3-4 hours and has gained good weight.  no

## 2025-01-22 ENCOUNTER — APPOINTMENT (OUTPATIENT)
Dept: ORTHOPEDIC SURGERY | Facility: CLINIC | Age: 89
End: 2025-01-22
Payer: MEDICARE

## 2025-01-22 DIAGNOSIS — R29.898 OTHER SYMPTOMS AND SIGNS INVOLVING THE MUSCULOSKELETAL SYSTEM: ICD-10-CM

## 2025-01-22 DIAGNOSIS — M65.4 RADIAL STYLOID TENOSYNOVITIS [DE QUERVAIN]: ICD-10-CM

## 2025-01-22 DIAGNOSIS — M79.641 PAIN IN RIGHT HAND: ICD-10-CM

## 2025-01-22 DIAGNOSIS — M19.049 PRIMARY OSTEOARTHRITIS, UNSPECIFIED HAND: ICD-10-CM

## 2025-01-22 DIAGNOSIS — M75.50 BURSITIS OF UNSPECIFIED SHOULDER: ICD-10-CM

## 2025-01-22 DIAGNOSIS — M25.511 PAIN IN RIGHT SHOULDER: ICD-10-CM

## 2025-01-22 DIAGNOSIS — M75.90 BURSITIS OF UNSPECIFIED SHOULDER: ICD-10-CM

## 2025-01-22 DIAGNOSIS — M19.019 PRIMARY OSTEOARTHRITIS, UNSPECIFIED SHOULDER: ICD-10-CM

## 2025-01-22 DIAGNOSIS — G89.29 PAIN IN RIGHT SHOULDER: ICD-10-CM

## 2025-01-22 DIAGNOSIS — M25.512 PAIN IN RIGHT SHOULDER: ICD-10-CM

## 2025-01-22 DIAGNOSIS — M79.642 PAIN IN RIGHT HAND: ICD-10-CM

## 2025-01-22 PROCEDURE — 99214 OFFICE O/P EST MOD 30 MIN: CPT | Mod: 25

## 2025-01-22 PROCEDURE — 20550 NJX 1 TENDON SHEATH/LIGAMENT: CPT | Mod: 59,LT

## 2025-01-22 PROCEDURE — 20600 DRAIN/INJ JOINT/BURSA W/O US: CPT | Mod: 59,LT

## 2025-01-22 PROCEDURE — 20610 DRAIN/INJ JOINT/BURSA W/O US: CPT | Mod: 50

## 2025-04-22 ENCOUNTER — APPOINTMENT (OUTPATIENT)
Dept: ORTHOPEDIC SURGERY | Facility: CLINIC | Age: 89
End: 2025-04-22
Payer: MEDICARE

## 2025-04-22 DIAGNOSIS — M19.049 PRIMARY OSTEOARTHRITIS, UNSPECIFIED HAND: ICD-10-CM

## 2025-04-22 DIAGNOSIS — M75.90 BURSITIS OF UNSPECIFIED SHOULDER: ICD-10-CM

## 2025-04-22 DIAGNOSIS — M75.50 BURSITIS OF UNSPECIFIED SHOULDER: ICD-10-CM

## 2025-04-22 PROCEDURE — 99213 OFFICE O/P EST LOW 20 MIN: CPT

## 2025-04-22 RX ORDER — MELOXICAM 15 MG/1
15 TABLET ORAL DAILY
Qty: 15 | Refills: 1 | Status: ACTIVE | COMMUNITY
Start: 2025-04-22 | End: 1900-01-01

## 2025-09-08 ENCOUNTER — APPOINTMENT (OUTPATIENT)
Dept: INTERNAL MEDICINE | Facility: CLINIC | Age: 89
End: 2025-09-08
Payer: MEDICARE

## 2025-09-08 VITALS — OXYGEN SATURATION: 98 % | HEART RATE: 67 BPM

## 2025-09-08 VITALS
HEIGHT: 64 IN | SYSTOLIC BLOOD PRESSURE: 150 MMHG | WEIGHT: 174 LBS | BODY MASS INDEX: 29.71 KG/M2 | DIASTOLIC BLOOD PRESSURE: 55 MMHG

## 2025-09-08 DIAGNOSIS — I10 ESSENTIAL (PRIMARY) HYPERTENSION: ICD-10-CM

## 2025-09-08 DIAGNOSIS — I50.40 UNSPECIFIED COMBINED SYSTOLIC (CONGESTIVE) AND DIASTOLIC (CONGESTIVE) HEART FAILURE: ICD-10-CM

## 2025-09-08 DIAGNOSIS — M81.0 AGE-RELATED OSTEOPOROSIS W/OUT CURRENT PATHOLOGICAL FRACTURE: ICD-10-CM

## 2025-09-08 DIAGNOSIS — J45.30 MILD PERSISTENT ASTHMA, UNCOMPLICATED: ICD-10-CM

## 2025-09-08 DIAGNOSIS — J30.2 OTHER SEASONAL ALLERGIC RHINITIS: ICD-10-CM

## 2025-09-08 DIAGNOSIS — Z79.899 OTHER LONG TERM (CURRENT) DRUG THERAPY: ICD-10-CM

## 2025-09-08 DIAGNOSIS — I25.10 ATHEROSCLEROTIC HEART DISEASE OF NATIVE CORONARY ARTERY W/OUT ANGINA PECTORIS: ICD-10-CM

## 2025-09-08 DIAGNOSIS — I48.0 PAROXYSMAL ATRIAL FIBRILLATION: ICD-10-CM

## 2025-09-08 PROCEDURE — 99213 OFFICE O/P EST LOW 20 MIN: CPT

## 2025-09-08 PROCEDURE — 99203 OFFICE O/P NEW LOW 30 MIN: CPT

## 2025-09-08 RX ORDER — LEVOCETIRIZINE DIHYDROCHLORIDE 5 MG/1
5 TABLET ORAL DAILY
Qty: 90 | Refills: 0 | Status: ACTIVE | COMMUNITY
Start: 2025-09-08 | End: 1900-01-01

## 2025-09-08 RX ORDER — BENZONATATE 100 MG/1
100 CAPSULE ORAL 3 TIMES DAILY
Qty: 10 | Refills: 0 | Status: ACTIVE | COMMUNITY
Start: 2025-09-08 | End: 1900-01-01

## 2025-09-08 RX ORDER — FLUTICASONE FUROATE AND VILANTEROL TRIFENATATE 100; 25 UG/1; UG/1
100-25 POWDER RESPIRATORY (INHALATION)
Qty: 60 | Refills: 1 | Status: ACTIVE | COMMUNITY
Start: 2025-09-08 | End: 1900-01-01

## 2025-09-10 PROBLEM — M81.0 AGE-RELATED OSTEOPOROSIS WITHOUT CURRENT PATHOLOGICAL FRACTURE: Status: ACTIVE | Noted: 2025-09-10

## 2025-09-10 PROBLEM — I25.10 CORONARY ARTERY DISEASE INVOLVING NATIVE CORONARY ARTERY OF NATIVE HEART WITHOUT ANGINA PECTORIS: Status: ACTIVE | Noted: 2025-09-10

## 2025-09-10 PROBLEM — I10 HYPERTENSION, BENIGN: Status: ACTIVE | Noted: 2025-09-10

## 2025-09-10 PROBLEM — I50.40 COMBINED SYSTOLIC AND DIASTOLIC CONGESTIVE HEART FAILURE, UNSPECIFIED HF CHRONICITY: Status: ACTIVE | Noted: 2025-09-10

## 2025-09-10 PROBLEM — M81.0 OSTEOPOROSIS: Status: ACTIVE | Noted: 2025-09-10

## 2025-09-10 PROBLEM — I48.0 PAROXYSMAL ATRIAL FIBRILLATION: Status: ACTIVE | Noted: 2025-09-10

## 2025-09-10 PROBLEM — Z79.899 MEDICATION MANAGEMENT: Status: ACTIVE | Noted: 2025-09-10

## 2025-09-10 RX ORDER — METOPROLOL SUCCINATE 100 MG/1
100 TABLET, EXTENDED RELEASE ORAL DAILY
Qty: 90 | Refills: 1 | Status: ACTIVE | COMMUNITY
Start: 2025-09-10 | End: 1900-01-01

## 2025-09-10 RX ORDER — HYDRALAZINE HYDROCHLORIDE 100 MG/1
100 TABLET ORAL
Qty: 90 | Refills: 0 | Status: ACTIVE | COMMUNITY
Start: 2025-09-10 | End: 1900-01-01

## 2025-09-10 RX ORDER — FUROSEMIDE 40 MG/1
40 TABLET ORAL TWICE DAILY
Qty: 180 | Refills: 0 | Status: ACTIVE | COMMUNITY
Start: 2025-09-10 | End: 1900-01-01

## 2025-09-10 RX ORDER — ATORVASTATIN CALCIUM 10 MG/1
10 TABLET, FILM COATED ORAL
Qty: 90 | Refills: 1 | Status: ACTIVE | COMMUNITY
Start: 2025-09-10 | End: 1900-01-01

## 2025-09-10 RX ORDER — POTASSIUM CHLORIDE 750 MG/1
10 TABLET, FILM COATED, EXTENDED RELEASE ORAL DAILY
Qty: 90 | Refills: 1 | Status: ACTIVE | COMMUNITY
Start: 2025-09-10 | End: 1900-01-01

## 2025-09-10 RX ORDER — RIVAROXABAN 20 MG/1
20 TABLET, FILM COATED ORAL
Qty: 90 | Refills: 1 | Status: ACTIVE | COMMUNITY
Start: 2025-09-10

## (undated) DEVICE — BLADE SURGICAL #15 CARBON

## (undated) DEVICE — DRAPE U LONG (CLEAR) 47 X 70"

## (undated) DEVICE — VENODYNE/SCD SLEEVE CALF MEDIUM

## (undated) DEVICE — SOL INJ NS 0.9% 100ML

## (undated) DEVICE — SUT MONOCRYL 3-0 27" PS-2 UNDYED

## (undated) DEVICE — DRSG DERMABOND PRINEO 60CM

## (undated) DEVICE — DRAPE XL SHEET 77X98"

## (undated) DEVICE — NDL SPINAL 18G X 3.5" (PINK)

## (undated) DEVICE — SOL IRR BAG NS 0.9% 3000ML

## (undated) DEVICE — SUT VICRYL 0 36" CT-1 UNDYED

## (undated) DEVICE — ELCTR STRYKER NEPTUNE SMOKE EVACUATION PENCIL (GREEN)

## (undated) DEVICE — ZIMMER CEMENT MIXING SYSTEM CLEARMIX SINGLE/DOUBLE

## (undated) DEVICE — SOL IRR POUR NS 0.9% 1000ML

## (undated) DEVICE — HOOD FLYTE STRYKER SURGICOOL W PEELAWAY

## (undated) DEVICE — SUT VICRYL 2-0 27" CT-2 UNDYED

## (undated) DEVICE — ZIMMER PULSAVAC PLUS FAN KIT

## (undated) DEVICE — SUT VICRYL 1 36" CT-1 UNDYED

## (undated) DEVICE — DRAPE 3/4 SHEET 52X76"

## (undated) DEVICE — ELCTR AQUAMANTYS BIPOLAR SEALER 6.0

## (undated) DEVICE — SYR LUER LOK 50CC

## (undated) DEVICE — WOUND IRR SURGIPHOR

## (undated) DEVICE — Device

## (undated) DEVICE — DRAIN JACKSON PRATT 3 SPRING RESERVOIR W 10FR PVC DRAIN

## (undated) DEVICE — DRAPE 1/2 SHEET 40X57"

## (undated) DEVICE — PACK TOTAL KNEE

## (undated) DEVICE — ORTHOALIGN PLUS UNIT

## (undated) DEVICE — WOUND IRR IRRISEPT W 0.5 CHG

## (undated) DEVICE — SOL IRR POUR NS 0.9% 500ML

## (undated) DEVICE — WARMING BLANKET UPPER ADULT

## (undated) DEVICE — SOL IRR POUR H2O 1500ML